# Patient Record
Sex: MALE | Race: WHITE | NOT HISPANIC OR LATINO | Employment: OTHER | URBAN - METROPOLITAN AREA
[De-identification: names, ages, dates, MRNs, and addresses within clinical notes are randomized per-mention and may not be internally consistent; named-entity substitution may affect disease eponyms.]

---

## 2017-04-27 ENCOUNTER — HISTORICAL (OUTPATIENT)
Dept: ADMINISTRATIVE | Facility: HOSPITAL | Age: 77
End: 2017-04-27

## 2018-01-18 ENCOUNTER — HISTORICAL (OUTPATIENT)
Dept: ADMINISTRATIVE | Facility: HOSPITAL | Age: 78
End: 2018-01-18

## 2020-02-04 ENCOUNTER — HISTORICAL (OUTPATIENT)
Dept: ADMINISTRATIVE | Facility: HOSPITAL | Age: 80
End: 2020-02-04

## 2022-04-11 ENCOUNTER — HISTORICAL (OUTPATIENT)
Dept: ADMINISTRATIVE | Facility: HOSPITAL | Age: 82
End: 2022-04-11

## 2022-04-24 VITALS
BODY MASS INDEX: 25.19 KG/M2 | HEIGHT: 72 IN | SYSTOLIC BLOOD PRESSURE: 137 MMHG | DIASTOLIC BLOOD PRESSURE: 77 MMHG | WEIGHT: 186 LBS

## 2022-05-05 NOTE — HISTORICAL [facility] CERNER
This is a historical note converted from Miguelina. Formatting and pictures may have been removed.  Please reference Miguelina for original formatting and attached multimedia. Chief Complaint  Pt is here for right elbow pain. Pt stated the pain started 4 months ago when he was lifting something heavy and he heard a pop  History of Present Illness  having right elbow pain on the inner side  no swelling or redness  no numbness or tingling  Review of Systems  Review of Systems?  ?????Constitutional: ?No fever, No chills. ?  ?????Respiratory: ?No shortness of breath, No cough. ?  ?????Cardiovascular: ?No chest pain. ?  ?????Gastrointestinal: ?No nausea, No vomiting, No diarrhea, No constipation, No heartburn. ?  ?????Genitourinary: ?No dysuria, No hematuria. ?  ?????Hematology/Lymphatics: ?No bleeding tendency. ?  ?????Endocrine: ?No polyuria. ?  ?????Neurologic: ?Alert and oriented X4, No numbness, No tingling. ?  ?????Psychiatric: ?No anxiety, No depression. ?  ?????Integumentary: no abnormalities except as noted in history of present illness  Physical Exam  Vitals & Measurements  T:?36.1? ?C (Oral)? HR:?80(Peripheral)? BP:?137/77?  HT:?182?cm? WT:?84.36?kg? BMI:?25.47?  right elbow exam  TTP medial epicondyle  negative Tinel at the elbow  pain with gripping  intact motor  full ROM of the elbow, wrist and hand  no edema  no erythema  ?  right elbow x-rays taken today  no acute bony abnormalities  ?  diagnosis  right elbow medial epicondylitis  ?  plan  right golfers elbow injection (medial epicondylitis)  Administered corticosteroid injection??1cc cortisone and 1cc lidocaine using sterile technique after informed verbal consent. Risks discussed with patient prior to injection. Informed the patient of the following:  -?Explained to patient that this injection in some patients will experience increased pain a few minutes after the injection and that the pain will last anywhere from 10 to 20 minutes. In order to decrease the  pain you need to do the following:  ?Make sure to move the extremity in which the injection was given. If you do not move the medication sits there and can cause more pain.  Ice the affected joint every 2 hours for 20 minutes at a time for the next 24 hours.  ?If you are not already taking an anti-inflammatory take the following Ibuprofen/ Advil take 3 to 4 tablets every 6 to 8 hours or 2 Aleve every 12 hours for the next 5 days to help the medication work. If you cannot take anti-inflammatory take 2 extra strength Tylenol every 6 hours for the next 5 days.  Patient voiced understanding ?????????????????????????????????????????????????????????????????????????????  Assessment/Plan  1.?Medial epicondylitis, right elbow?M77.01  Ordered:  meloxicam, 7.5 mg = 1 tab(s), Oral, Daily, # 90 tab(s), 0 Refill(s), Pharmacy: [facility], 182, cm, Height/Length Dosing, 02/04/20 11:10:00 CST, 84.36, kg, Weight Dosing, 02/04/20 11:10:00 CST  ?  Orders:  XR Elbow Right Minimum 3 Views, Routine, 02/04/20 11:11:00 CST, Pain, None, Ambulatory, Rad Type, Right elbow pain, 02/04/20 11:11:00 CST   Problem List/Past Medical History  Ongoing  Acid reflux  Diabetes mellitus  High cholesterol  Insomnia  Medial epicondylitis, right elbow  Prostate  Right tennis elbow  Historical  No qualifying data  Procedure/Surgical History  GALLBLADDER (2003)  HERNIA REPAIR (2003)  OPEN HEART SURGERY (1999)  cardiac ablation  LEFT ARM SURGERY  STONE REMOVED FROM BLADDER   Medications  aspirin 81 mg oral tablet, 81 mg= 1 tab(s), Oral, Daily  ATORVASTATIN TAB 20MG, 20 mg= 1 tab(s), Oral, Daily  Centrum Silver oral tablet, 1 tab(s), Oral, Daily  Co-Q10, Oral  DILTIAZEM CAP 240MG ER, 240 mg= 1 cap(s), Oral, Daily  folic acid 0.4 mg oral tablet, 0.4 mg= 1 tab(s), Oral, Daily  meloxicam 7.5 mg oral tablet, 7.5 mg= 1 tab(s), Oral, Daily  METFORMIN TAB 500MG, 500 mg= 1 tab(s), Oral, BID  NATURE-THROID 32.5MG Tablets, 32.5 mg= 1 tab(s), Oral,  Daily  PANTOPRAZOLE TAB 40MG, 40 mg= 1 tab(s), Oral, Daily  TAMSULOSIN CAP 0.4MG, 0.4 mg= 1 cap(s), Oral, Daily  Vitamin B6 100 mg oral tablet, 100 mg= 1 tab(s), Oral, Daily  Vitamin C 1000 mg oral tablet, 1000 mg= 1 tab(s), Oral, Daily  vitamin E 400 intl units oral capsule, 400 IntUnit= 1 cap(s), Oral, Daily  ZOLPIDEM TAB 10MG, 10 mg= 1 tab(s), Oral, qPM  Allergies  No Known Medication Allergies  Social History  Abuse/Neglect  No, 02/04/2020  Alcohol  Current, Liquor, 1-2 times per month, 01/18/2018  Substance Use  Never, 01/18/2018  Tobacco  Former smoker, quit more than 30 days ago, N/A, 02/04/2020  Former smoker, 01/18/2018  Family History  Family history is unknown  Health Maintenance  Health Maintenance  ???Pending?(in the next year)  ??? ??OverDue  ??? ? ? ?Diabetes Maintenance-Urine Dipstick due??04/27/18??and every 1??year(s)  ??? ? ? ?Aspirin Therapy for CVD Prevention due??01/18/19??and every 1??year(s)  ??? ? ? ?Advance Directive due??01/01/20??and every 1??year(s)  ??? ? ? ?Geriatric Depression Screening due??01/01/20??and every 1??year(s)  ??? ??Due?  ??? ? ? ?Cognitive Screening due??01/01/20??and every 1??year(s)  ??? ? ? ?Fall Risk Assessment due??01/01/20??and every 1??year(s)  ??? ? ? ?Functional Assessment due??01/01/20??and every 1??year(s)  ??? ? ? ?ADL Screening due??02/04/20??and every 1??year(s)  ??? ? ? ?Diabetes Maintenance-Microalbumin due??02/04/20??Variable frequency  ??? ? ? ?Diabetes Maintenance-Serum Creatinine due??02/04/20??Variable frequency  ??? ? ? ?Pneumococcal Vaccine due??02/04/20??Variable frequency  ??? ? ? ?Tetanus Vaccine due??02/04/20??and every 10??year(s)  ??? ? ? ?Zoster Vaccine due??02/04/20??and every 100??year(s)  ??? ??Due In Future?  ??? ? ? ?Obesity Screening not due until??01/01/21??and every 1??year(s)  ???Satisfied?(in the past 1 year)  ??? ??Satisfied?  ??? ? ? ?Blood Pressure Screening on??02/04/20.??Satisfied by Flaquita Kevin LPN  ??? ? ? ?Body Mass  Index Check on??02/04/20.??Satisfied by Flaquita Kevin LPN  ??? ? ? ?Obesity Screening on??02/04/20.??Satisfied by Flaquita Kevin LPN  ?

## 2022-05-05 NOTE — HISTORICAL [facility] CERNER
This is a historical note converted from Miguelina. Formatting and pictures may have been removed.  Please reference Miguelina for original formatting and attached multimedia. Chief Complaint  NEW PATIENT HERE FOR RIGHT ELBOW PAIN. WILL GET XRAYS TODAY  History of Present Illness  right elbow pain on the outer side  no trauma of falls  pain is not debilitating  Review of Systems  Review of Systems  ????Constitutional: No fever, No chills.  ????Respiratory: No shortness of breath, No cough.  ????Cardiovascular: No chest pain.  ????Gastrointestinal: No nausea, No vomiting, No diarrhea, No constipation, No heartburn.  ????Genitourinary: No dysuria, No hematuria.  ????Hematology/Lymphatics: No bleeding tendency.  ????Endocrine: No polyuria.  ????Neurologic: Alert and oriented X4, No numbness, No tingling.  ????Psychiatric: No anxiety, No depression.  Physical Exam  Vitals & Measurements  HT:?182?cm? HT:?182?cm? WT:?84.36?kg? WT:?84.36?kg? BMI:?25.47?  right elbow PE  ?  TTP lateral epicondyle  full ROM  intact sensation and motor  pain with resisted extension of the wrist  pain with gripping  ?  x-rays today  no bony abnormalities  ?  NSAIDS OTC  avoid hard gripping  ?  Assessment/Plan  1.?Right tennis elbow  Ordered:  Clinic Follow up, 01/18/18 14:44:00 CST, prn, Right tennis elbow, [facility]  Office/Outpatient Visit Level 3 New [id number] PC, Right tennis elbow, [facility], 01/18/18 14:44:00 CST  ?  Right elbow pain  Ordered:  XR Elbow Right Minimum 3 Views, Routine, 01/18/18 14:19:00 CST, Pain, None, Ambulatory, Rad Type, Right elbow pain, 01/18/18 14:19:00 CST  ?   Problem List/Past Medical History  Ongoing  Acid reflux  Diabetes mellitus  High cholesterol  Insomnia  Prostate  Right tennis elbow  Historical  Procedure/Surgical History  GALLBLADDER (2003)  HERNIA REPAIR (2003)  OPEN HEART SURGERY (1999)  LEFT ARM SURGERY  STONE REMOVED FROM BLADDER  Medications  aspirin 81 mg oral tablet, 81 mg= 1 tab(s),  Oral, Daily  ATORVASTATIN TAB 20MG, 20 mg= 1 tab(s), Oral, Daily  Centrum Silver oral tablet, 1 tab(s), Oral, Daily  Co-Q10, Oral  DILTIAZEM CAP 240MG ER, 240 mg= 1 cap(s), Oral, Daily  folic acid 0.4 mg oral tablet, 0.4 mg= 1 tab(s), Oral, Daily  METFORMIN TAB 500MG, 500 mg= 1 tab(s), Oral, BID  NATURE-THROID 32.5MG Tablets, 32.5 mg= 1 tab(s), Oral, Daily  PANTOPRAZOLE TAB 40MG, 40 mg= 1 tab(s), Oral, Daily  TAMSULOSIN CAP 0.4MG, 0.4 mg= 1 cap(s), Oral, Daily  Vitamin B6 100 mg oral tablet, 100 mg= 1 tab(s), Oral, Daily  Vitamin C 1000 mg oral tablet, 1000 mg= 1 tab(s), Oral, Daily  vitamin E 400 intl units oral capsule, 400 IntUnit= 1 cap(s), Oral, Daily  ZOLPIDEM TAB 10MG, 10 mg= 1 tab(s), Oral, qPM  Allergies  No Known Medication Allergies  Social History  Alcohol - 01/18/2018  Current, Liquor, 1-2 times per month  Substance Abuse - 01/18/2018  Never  Tobacco - 01/18/2018  Former smoker  Family History  Family history is unknown

## 2023-04-12 DIAGNOSIS — R41.3 MEMORY IMPAIRMENT: Primary | ICD-10-CM

## 2023-07-10 ENCOUNTER — OFFICE VISIT (OUTPATIENT)
Dept: NEUROLOGY | Facility: CLINIC | Age: 83
End: 2023-07-10
Payer: MEDICARE

## 2023-07-10 VITALS
DIASTOLIC BLOOD PRESSURE: 68 MMHG | HEIGHT: 72 IN | BODY MASS INDEX: 26.28 KG/M2 | SYSTOLIC BLOOD PRESSURE: 116 MMHG | HEART RATE: 72 BPM | WEIGHT: 194 LBS

## 2023-07-10 DIAGNOSIS — F03.A0 MILD DEMENTIA WITHOUT BEHAVIORAL DISTURBANCE, PSYCHOTIC DISTURBANCE, MOOD DISTURBANCE, OR ANXIETY, UNSPECIFIED DEMENTIA TYPE: Primary | ICD-10-CM

## 2023-07-10 DIAGNOSIS — R41.3 MEMORY IMPAIRMENT: ICD-10-CM

## 2023-07-10 PROCEDURE — 99205 PR OFFICE/OUTPT VISIT, NEW, LEVL V, 60-74 MIN: ICD-10-PCS | Mod: S$PBB,,, | Performed by: PSYCHIATRY & NEUROLOGY

## 2023-07-10 PROCEDURE — 99999 PR PBB SHADOW E&M-EST. PATIENT-LVL III: CPT | Mod: PBBFAC,,, | Performed by: PSYCHIATRY & NEUROLOGY

## 2023-07-10 PROCEDURE — 99205 OFFICE O/P NEW HI 60 MIN: CPT | Mod: S$PBB,,, | Performed by: PSYCHIATRY & NEUROLOGY

## 2023-07-10 PROCEDURE — 99999 PR PBB SHADOW E&M-EST. PATIENT-LVL III: ICD-10-PCS | Mod: PBBFAC,,, | Performed by: PSYCHIATRY & NEUROLOGY

## 2023-07-10 PROCEDURE — 99213 OFFICE O/P EST LOW 20 MIN: CPT | Mod: PBBFAC | Performed by: PSYCHIATRY & NEUROLOGY

## 2023-07-10 RX ORDER — PANTOPRAZOLE SODIUM 40 MG/1
40 TABLET, DELAYED RELEASE ORAL DAILY
COMMUNITY

## 2023-07-10 RX ORDER — GABAPENTIN 400 MG/1
1600 CAPSULE ORAL 2 TIMES DAILY
COMMUNITY
Start: 2023-06-12

## 2023-07-10 RX ORDER — ATORVASTATIN CALCIUM 40 MG/1
40 TABLET, FILM COATED ORAL NIGHTLY
COMMUNITY
Start: 2023-07-03

## 2023-07-10 RX ORDER — FLUOXETINE HYDROCHLORIDE 20 MG/1
20 CAPSULE ORAL DAILY
COMMUNITY
Start: 2023-07-03

## 2023-07-10 RX ORDER — DONEPEZIL HYDROCHLORIDE 5 MG/1
TABLET, FILM COATED ORAL
Qty: 60 TABLET | Refills: 11 | Status: SHIPPED | OUTPATIENT
Start: 2023-07-10

## 2023-07-10 RX ORDER — ASPIRIN 81 MG/1
81 TABLET ORAL DAILY
COMMUNITY

## 2023-07-10 RX ORDER — ZOLPIDEM TARTRATE 10 MG/1
10 TABLET ORAL NIGHTLY
COMMUNITY
Start: 2023-07-03

## 2023-07-10 RX ORDER — METFORMIN HYDROCHLORIDE 500 MG/1
500 TABLET ORAL 2 TIMES DAILY
COMMUNITY

## 2023-07-10 RX ORDER — TESTOSTERONE CYPIONATE 200 MG/ML
INJECTION, SOLUTION INTRAMUSCULAR
COMMUNITY
Start: 2023-06-19

## 2023-07-10 NOTE — PROGRESS NOTES
[facility] 7/10/2023   Alternating [place] Making 1 - correct   Cube 1 - correct   Clock Contour 1 - correct   Clock Numbers 1 - correct   Clock Hands 0 - incorrect   Lion 1 - correct   Rhino 1 - correct   Camel 1 - correct   Face Yes   Velvet Yes   Moravian Yes   Bonnie No   Red Yes   Face Yes   Velvet Yes   Moravian No   Bonnie Yes   Red No   Forward Order - 2 1 8 5 4 1 - correct   Attention - Backward Order 1 - correct   Letters 1 - correct   Numbers 3 - 4 or 5 correct   Repeat - Rolando 1 - correct   Repeat - Cat 1 - correct   Fluency  1 - correct   Train - Bicycle 1 - correct   Watch-Ruler 1 - correct   Face 0 - incorrect   Velvet 0 - incorrect   Moravian 1 - correct   Bonnie 1 - correct   Red 1 - correct   Date 0 - incorrect   Month 1 - correct   Year 1 - correct   Day 1 - correct   Place 1 - correct   City 1 - correct   Add 1 point if less than or equal to 12 yr edu 0 - incorrect   Total Score 26

## 2023-07-10 NOTE — PROGRESS NOTES
Chief Complaint   Patient presents with    Memory Loss     NP: Referred by Dr. Clif Christensen for neuro consult to evaluate for memory loss: 2 years ago started having memory loss and it is getting worse. Having difficulty with remembering names. Averages 8 hours of sleep at night. Still drive, lives with spouse. Does get frustrated when he cannot remember.        This is a 83 y.o. male  here for  memory loss.  Referred by Dr. Christensen.  Having difficulty with remembering names, as well as details of conversations he has with his wife.  He does still drive without problems.  He does not misplace things around the house.  He is quite active and actually helps with caregiving for his wife.  On 1 occasion patient had an episode for several hours in the morning where he lost time.  He woke up later and admitted to his wife that he did not remember what had occurred that morning.  She had fallen and the patient is actually actually fallen on top of her.  Ambulance was called to lift her up and check on her.  The patient did not remember any of this.  This was a single occurrence and nothing like this has ever happened before.  Notably the patient does take Ambien 10 mg at night and without it can not sleep.  He was previously taking Benadryl but was told to stop.  No mood symptoms.  No significant agitation.    MRI brain [facility] Mri- 2/222- significant volume loss and microvascular ischemia      Medication List with Changes/Refills   Current Medications    ASPIRIN (ECOTRIN) 81 MG EC TABLET    Take 81 mg by mouth Daily.    ATORVASTATIN (LIPITOR) 40 MG TABLET    Take 40 mg by mouth every evening.    FLUOXETINE 20 MG CAPSULE    Take 20 mg by mouth Daily.    GABAPENTIN (NEURONTIN) 400 MG CAPSULE    Take 1,600 mg by mouth 2 (two) times daily.    METFORMIN (GLUCOPHAGE) 500 MG TABLET    Take 500 mg by mouth 2 (two) times daily.    PANTOPRAZOLE (PROTONIX) 40 MG TABLET    Take 40 mg by mouth Daily.    TESTOSTERONE CYPIONATE  (DEPOTESTOTERONE CYPIONATE) 200 MG/ML INJECTION    Inject into the muscle every 7 days.    ZOLPIDEM (AMBIEN) 10 MG TAB    Take 10 mg by mouth every evening.        Past Surgical History:   Procedure Laterality Date    CARDIAC ELECTROPHYSIOLOGY MAPPING AND ABLATION      Double bypass  11/1998    GALLBLADDER SURGERY  04/2003    HERNIA REPAIR  04/2003    Removal of gallstones          Past Medical History:   Diagnosis Date    Anxiety disorder, unspecified     CAD (coronary artery disease)     Insomnia     Mixed hyperlipidemia     Neuropathy     Type 2 diabetes mellitus with unspecified diabetic retinopathy without macular edema         Family History   Problem Relation Age of Onset    Dementia Mother     Breast cancer Mother     Crohn's disease Brother         Social History     Socioeconomic History    Marital status:    Tobacco Use    Smoking status: Former     Types: Cigarettes    Smokeless tobacco: Never   Substance and Sexual Activity    Alcohol use: Yes     Alcohol/week: 3.0 standard drinks     Types: 1 Cans of beer, 2 Shots of liquor per week     Comment: 3 times weekly    Drug use: Never          Review of Systems  Review of Systems   Constitutional: Negative for appetite change.   HENT: Negative for sinus pressure and sore throat.    Eyes: Negative for visual disturbance.   Respiratory: Negative for cough and shortness of breath.    Cardiovascular: Negative for chest pain.   Gastrointestinal: Negative for diarrhea and nausea.   Endocrine: Negative for cold intolerance and heat intolerance.   Genitourinary: Negative for dysuria.   Musculoskeletal: Negative for arthralgias and myalgias.   Skin: Negative for rash.   Allergic/Immunologic: Negative for immunocompromised state.   Neurological:        See HPI   Hematological: Does not bruise/bleed easily.   Psychiatric/Behavioral: Negative for hallucinations.      General: alert and oriented, no acute distress, no audible wheezes, pulse intact, no  edema    Vitals:    07/10/23 0910   BP: 116/68   Pulse: 72        Cognition and Comprehension  Speech and language intact  Follows commands  Speech fluent  Attention intact  Memory for recent events intact from history taking  Affect pleasant  Fund of knowledge adequate    MOCA 26/30  missed 2/5 on recall    Cranial nerves  II. Optic: Visual fields full to confrontation both eyes  III, IV, VI. Oculomotor: Intact, Pupils equal, round and reactive to light, no nystagmus  V. Trigeminal: sensation to light touch normal  VII. No facial asymmetry or facial weakness  VIII. Hearing intact to spoken voice  IX/X. Glossopharyngeal/Vagus: Voice normal, palate rises symmetrically  XI. Axillary: Shoulder shrug normal  XII. Hypoglossal: Intact    Muscle Strength and Tone  Normal upper extremity tone  Normal lower extremity tone  Normal upper extremity strength  Normal lower extremity strength    Sensation  Intact to light touch and temperature    Reflexes  Normal and symmetric    Coordination and Gait  Finger to nose normal  Gait normal       Samson was seen today for memory loss.    Diagnoses and all orders for this visit:    Mild dementia without behavioral disturbance, psychotic disturbance, mood disturbance, or anxiety, unspecified dementia type    Memory impairment  -     Ambulatory referral/consult to Neurology  -     TSH; Future  -     Vitamin B12; Future    MCI.  Still very independent. Plan to start aricept

## 2024-02-20 ENCOUNTER — LAB REQUISITION (OUTPATIENT)
Dept: LAB | Facility: HOSPITAL | Age: 84
End: 2024-02-20
Payer: MEDICARE

## 2024-02-20 DIAGNOSIS — R79.9 ABNORMAL FINDING OF BLOOD CHEMISTRY, UNSPECIFIED: ICD-10-CM

## 2024-02-20 LAB — KOH PREP SPEC: NORMAL

## 2024-02-20 PROCEDURE — 87075 CULTR BACTERIA EXCEPT BLOOD: CPT | Performed by: OTOLARYNGOLOGY

## 2024-02-20 PROCEDURE — 87077 CULTURE AEROBIC IDENTIFY: CPT | Performed by: OTOLARYNGOLOGY

## 2024-02-20 PROCEDURE — 87205 SMEAR GRAM STAIN: CPT | Performed by: OTOLARYNGOLOGY

## 2024-02-20 PROCEDURE — 87220 TISSUE EXAM FOR FUNGI: CPT | Performed by: OTOLARYNGOLOGY

## 2024-02-21 LAB
GRAM STN SPEC: NORMAL
GRAM STN SPEC: NORMAL

## 2024-02-23 LAB
BACTERIA SPEC ANAEROBE CULT: NORMAL
BACTERIA SPEC CULT: ABNORMAL

## 2024-05-03 ENCOUNTER — OFFICE VISIT (OUTPATIENT)
Dept: NEUROLOGY | Facility: CLINIC | Age: 84
End: 2024-05-03
Payer: MEDICARE

## 2024-05-03 VITALS
HEIGHT: 72 IN | HEART RATE: 60 BPM | DIASTOLIC BLOOD PRESSURE: 70 MMHG | BODY MASS INDEX: 25.73 KG/M2 | WEIGHT: 190 LBS | SYSTOLIC BLOOD PRESSURE: 132 MMHG

## 2024-05-03 DIAGNOSIS — E11.42 DIABETIC POLYNEUROPATHY ASSOCIATED WITH TYPE 2 DIABETES MELLITUS: ICD-10-CM

## 2024-05-03 DIAGNOSIS — G31.84 MILD COGNITIVE IMPAIRMENT: Primary | ICD-10-CM

## 2024-05-03 PROCEDURE — 99214 OFFICE O/P EST MOD 30 MIN: CPT | Mod: S$PBB,,, | Performed by: NURSE PRACTITIONER

## 2024-05-03 PROCEDURE — 99999 PR PBB SHADOW E&M-EST. PATIENT-LVL III: CPT | Mod: PBBFAC,,, | Performed by: NURSE PRACTITIONER

## 2024-05-03 PROCEDURE — 99213 OFFICE O/P EST LOW 20 MIN: CPT | Mod: PBBFAC | Performed by: NURSE PRACTITIONER

## 2024-05-03 NOTE — ASSESSMENT & PLAN NOTE
-already on gbp.  Device from [employer] has helped with pain control and feels symptoms are manageable for now sow ill hold off on any additional meds.

## 2024-05-03 NOTE — ASSESSMENT & PLAN NOTE
-over the last 4 months, the patient had 2 surgeries, lost his son, and had infections with COVID and strep.  He has been up and able to work out in 4 months as a result of all of these issues.  I am hoping that the stress response has caused a temporary worsening of his cognition, but we will see him back in 6 months to re-evaluate.  Continue Aricept 10 mg.  Resume exercise once able

## 2024-05-03 NOTE — PROGRESS NOTES
Subjective:       Patient ID: Samson Aleman is a 84 y.o. male.    Chief Complaint: Memory Loss (Pt states he has noticed some progression with long term )      History of Present Illness:  Follow-up visit for memory.  Called for a sooner than regularly scheduled appointment due to worsening cognition.  He has noticed worsening over the past several months.  Notably, he has had a very stressful 4-5 months.  Since January, he has had a AAA repair, major sinus surgery, had son passed away, and contracted COVID and strep infections.  He historically is an avid exerciser, but has not been able to exercise at all over the past 4 months because of everything going on in life.  He plans to resume very soon.  He tells me he is having difficulty with short-term and long-term memory.  No changes in personality or mood.  No issues with tremor falls.  No problems swallowing.  He did have labs after last visit including B12 and TSH which were normal.    He tells me Dr. Christensen wanted him to discuss his neuropathy with us.  Recall, patient is a diabetic.  He tells me he is dealt with BLE neuropathy for several years.  Over the past year so, the neuropathy has progressed upwards to just above the knee where it stopped around the calf line for several years.  He is currently on gabapentin which helps with his pain.  He says his PCP recently tried to start him on another medication for neuropathy, but it caused mouth foaming.  I do not have these records.  Patient is unsure what his last A1c was.  He did order some device off [employer] that has really helped to improve his pain.  It is overall manageable now.            Past Medical History:   Diagnosis Date    Anxiety disorder, unspecified     CAD (coronary artery disease)     Insomnia     Mixed hyperlipidemia     Neuropathy     Type 2 diabetes mellitus with unspecified diabetic retinopathy without macular edema        Past Surgical History:   Procedure Laterality Date    CARDIAC  ELECTROPHYSIOLOGY MAPPING AND ABLATION      Double bypass  11/1998    GALLBLADDER SURGERY  04/2003    HERNIA REPAIR  04/2003    Removal of gallstones          Family History   Problem Relation Name Age of Onset    Dementia Mother      Breast cancer Mother      Crohn's disease Brother          Social History     Socioeconomic History    Marital status:    Tobacco Use    Smoking status: Former     Types: Cigarettes    Smokeless tobacco: Never   Substance and Sexual Activity    Alcohol use: Yes     Alcohol/week: 3.0 standard drinks of alcohol     Types: 1 Cans of beer, 2 Shots of liquor per week     Comment: 3 times weekly    Drug use: Never     Social Determinants of Health     Financial Resource Strain: Low Risk  (1/15/2024)    [facility]    Overall Financial Resource Strain (CARDIA)     Difficulty of Paying Living Expenses: Not hard at all   Food Insecurity: No Food Insecurity (1/15/2024)    [facility]    Hunger Vital Sign     Worried About Running Out of Food in the Last Year: Never true     Ran Out of Food in the Last Year: Never true   Transportation Needs: No Transportation Needs (1/15/2024)    [facility]    PRAPARE - Transportation     Lack of Transportation (Medical): No     Lack of Transportation (Non-Medical): No   Physical Activity: Insufficiently Active (7/25/2019)    [facility]    Exercise Vital Sign     Days of Exercise per Week: 3 days     Minutes of Exercise per Session: 30 min   Stress: No Stress Concern Present (7/25/2019)    Received from [facility]    [facility]  of Occupational Health - Occupational Stress Questionnaire     Feeling of Stress : Only a little        Outpatient Encounter Medications as of 5/3/2024   Medication Sig Dispense Refill    aspirin (ECOTRIN) 81 MG EC tablet Take 81 mg by mouth Daily.      atorvastatin (LIPITOR) 40 MG tablet Take 40 mg by mouth every evening.      donepeziL (ARICEPT) 5 MG tablet Take 1 tab Po Q PM for 1 month, then increase to 1 PO BID thereafter (Patient taking differently: Take 10 mg by mouth once daily.) 60 tablet 11    FLUoxetine 20 MG capsule Take 20 mg by mouth Daily.      gabapentin (NEURONTIN) 400 MG capsule Take 1,600 mg by mouth 2 (two) times a day.      metFORMIN (GLUCOPHAGE) 500 MG tablet Take 500 mg by mouth 2 (two) times daily.      testosterone cypionate (DEPOTESTOTERONE CYPIONATE) 200 mg/mL injection Inject into the muscle every 7 days.      zolpidem (AMBIEN) 10 mg Tab Take 10 mg by mouth every evening.      [DISCONTINUED] pantoprazole (PROTONIX) 40 MG tablet Take 40 mg by mouth Daily.       No facility-administered encounter medications on file as of 5/3/2024.      Objective:   /70   Pulse 60   Ht 6' (1.829 m)   Wt 86.2 kg (190 lb)   BMI 25.77 kg/m²        Physical Exam  General:  Alert and oriented  NAD  No overt edema    Cognition and Comprehension:  Speech and language intact  Follows commands    Cranial nerves:   CN 2_ Visual fields (full to confrontation both eyes)  CN 3, 4, 6_ Intact, DAYRON, no nystagmus  CN 5_facial tactile sensation intact  CN 7_no face asymmetry; normal eye closure and smile  CN 8_hearing intact to spoken voice  CN 9, 10, 11_voice normal, shoulder shrug ok; deltoids not fatigable   CN 12 tongue_protrudes mid line    Muscle Strength and Tone:  Normal upper extremity tone  Normal lower extremity tone  Normal upper extremity strength  Normal lower extremity strength    Reflexes:  Normal and symmetric    Sensation:  Intact to light touch and temperature    Coordination and  Gait:  Coordination and gait are normal       Assessment & Plan:      1. Mild cognitive impairment  Overview:  Initially evaluated in July 2023 for difficulty with short-term memory that started about 2 years prior.  MRI brain 2022 with significant volume loss and chronic microvascular ischemia.    MOCAs:   07/2023: 26/30-missed 2 on recall  05/2024: 23/30    Assessment & Plan:  -over the last 4 months, the patient had 2 surgeries, lost his son, and had infections with COVID and strep.  He has been up and able to work out in 4 months as a result of all of these issues.  I am hoping that the stress response has caused a temporary worsening of his cognition, but we will see him back in 6 months to re-evaluate.  Continue Aricept 10 mg.  Resume exercise once able      2. Diabetic polyneuropathy associated with type 2 diabetes mellitus  Assessment & Plan:  -already on gbp.  Device from [employer] has helped with pain control and feels symptoms are manageable for now sow ill hold off on any additional meds.            This note was created with the assistance of voice recognition software. There may be transcription errors as a result of using this technology however minimal. Effort has been made to assure accuracy of transcription but any obvious errors or omissions should be clarified with the author of the document.

## 2024-07-23 ENCOUNTER — LAB REQUISITION (OUTPATIENT)
Dept: LAB | Facility: HOSPITAL | Age: 84
End: 2024-07-23
Payer: MEDICARE

## 2024-07-23 DIAGNOSIS — R79.9 ABNORMAL FINDING OF BLOOD CHEMISTRY, UNSPECIFIED: ICD-10-CM

## 2024-07-23 LAB
GRAM STN SPEC: NORMAL
GRAM STN SPEC: NORMAL

## 2024-07-23 PROCEDURE — 87205 SMEAR GRAM STAIN: CPT | Performed by: OTOLARYNGOLOGY

## 2024-07-23 PROCEDURE — 87186 SC STD MICRODIL/AGAR DIL: CPT | Mod: 91 | Performed by: OTOLARYNGOLOGY

## 2024-07-23 PROCEDURE — 87075 CULTR BACTERIA EXCEPT BLOOD: CPT | Performed by: OTOLARYNGOLOGY

## 2024-07-23 PROCEDURE — 87070 CULTURE OTHR SPECIMN AEROBIC: CPT | Performed by: OTOLARYNGOLOGY

## 2024-07-23 PROCEDURE — 87077 CULTURE AEROBIC IDENTIFY: CPT | Mod: 91 | Performed by: OTOLARYNGOLOGY

## 2024-07-26 LAB — BACTERIA SPEC ANAEROBE CULT: NORMAL

## 2024-07-27 LAB
BACTERIA SPEC CULT: ABNORMAL
BACTERIA SPEC CULT: ABNORMAL

## 2024-11-08 ENCOUNTER — OFFICE VISIT (OUTPATIENT)
Dept: NEUROLOGY | Facility: CLINIC | Age: 84
End: 2024-11-08
Payer: MEDICARE

## 2024-11-08 VITALS
WEIGHT: 191.38 LBS | BODY MASS INDEX: 25.92 KG/M2 | DIASTOLIC BLOOD PRESSURE: 73 MMHG | SYSTOLIC BLOOD PRESSURE: 128 MMHG | HEART RATE: 53 BPM | HEIGHT: 72 IN

## 2024-11-08 DIAGNOSIS — E11.42 DIABETIC POLYNEUROPATHY ASSOCIATED WITH TYPE 2 DIABETES MELLITUS: Primary | ICD-10-CM

## 2024-11-08 DIAGNOSIS — G31.84 MILD COGNITIVE IMPAIRMENT: ICD-10-CM

## 2024-11-08 PROCEDURE — 99999 PR PBB SHADOW E&M-EST. PATIENT-LVL III: CPT | Mod: PBBFAC,,, | Performed by: NURSE PRACTITIONER

## 2024-11-08 PROCEDURE — 99213 OFFICE O/P EST LOW 20 MIN: CPT | Mod: PBBFAC | Performed by: NURSE PRACTITIONER

## 2024-11-08 RX ORDER — FINASTERIDE 5 MG/1
1 TABLET, FILM COATED ORAL DAILY
COMMUNITY
Start: 2024-10-03

## 2024-11-08 NOTE — PROGRESS NOTES
Subjective:       Patient ID: Samson Aleman is a 84 y.o. male.    Chief Complaint: Memory Loss (Pt states memory seems to be doing better) and Diabetic neuropthy (Pt states he is stable doing well with gbp )      History of Present Illness:  Follow-up visit for memory neuropathy.  Neuropathy is stable with current dose of gabapentin.  He says his cognition is back to baseline prior to our last visit.  Recall, at our last visit in May, he had noted significant worsening in cognition, but it also had 2 major surgeries, a son passed away, and infections with COVID and strep in a 4-5 month time span.  Now, he feels he is back to his baseline.  He does still have some minor issues with short-term memory like remembering people's names, but is otherwise doing well.  He is still on Aricept 10 mg daily.            Past Medical History:   Diagnosis Date    Anxiety disorder, unspecified     CAD (coronary artery disease)     Insomnia     Mixed hyperlipidemia     Neuropathy     Type 2 diabetes mellitus with unspecified diabetic retinopathy without macular edema        Past Surgical History:   Procedure Laterality Date    CARDIAC ELECTROPHYSIOLOGY MAPPING AND ABLATION      Double bypass  11/1998    GALLBLADDER SURGERY  04/2003    HERNIA REPAIR  04/2003    Removal of gallstones          Family History   Problem Relation Name Age of Onset    Dementia Mother      Breast cancer Mother      Crohn's disease Brother          Social History     Socioeconomic History    Marital status:    Tobacco Use    Smoking status: Former     Types: Cigarettes    Smokeless tobacco: Never   Substance and Sexual Activity    Alcohol use: Yes     Alcohol/week: 3.0 standard drinks of alcohol     Types: 1 Cans of beer, 2 Shots of liquor per week     Comment: 3 times weekly    Drug use: Never     Social Drivers of Health     Financial Resource Strain: Low Risk  (1/15/2024)    [facility] and Its  Subsidiaries and Affiliates    Overall Financial Resource Strain (CARDIA)     Difficulty of Paying Living Expenses: Not hard at all   Food Insecurity: No Food Insecurity (1/15/2024)    Received from [facility] and Its Subsidiaries and Affiliates    Hunger Vital Sign     Worried About Running Out of Food in the Last Year: Never true     Ran Out of Food in the Last Year: Never true   Transportation Needs: No Transportation Needs (1/15/2024)    Received from [facility] and Its Subsidiaries and Affiliates    PRAPARE - Transportation     Lack of Transportation (Medical): No     Lack of Transportation (Non-Medical): No   Physical Activity: Insufficiently Active (7/25/2019)    Received from [facility] and Its Subsidiaries and Affiliates    Exercise Vital Sign     Days of Exercise per Week: 3 days     Minutes of Exercise per Session: 30 min   Stress: No Stress Concern Present (7/25/2019)    Received from [facility] and Its Subsidiaries and Affiliates    [employer] - Occupational Stress Questionnaire     Feeling of Stress : Only a little   Housing Stability: Unknown (1/15/2024)    Received from [facility] and Its Subsidiaries and Affiliates    Housing Stability Vital Sign     Unable to Pay for Housing in the Last Year: No     Number of Places Lived in the Last Year: 1        Outpatient Encounter Medications as of 11/8/2024   Medication Sig Dispense Refill    aspirin (ECOTRIN) 81 MG EC tablet Take 81 mg by mouth Daily.      atorvastatin (LIPITOR) 40 MG tablet Take 40 mg by mouth every evening.      donepeziL (ARICEPT) 5 MG tablet Take 1 tab Po Q PM for 1 month, then increase to 1 PO BID thereafter (Patient taking differently: Take 10 mg by mouth once daily.) 60 tablet 11    finasteride (PROSCAR) 5 mg  tablet Take 1 tablet by mouth once daily.      FLUoxetine 20 MG capsule Take 20 mg by mouth Daily.      gabapentin (NEURONTIN) 400 MG capsule Take 1,600 mg by mouth 2 (two) times a day.      metFORMIN (GLUCOPHAGE) 500 MG tablet Take 500 mg by mouth 2 (two) times daily.      testosterone cypionate (DEPOTESTOTERONE CYPIONATE) 200 mg/mL injection Inject into the muscle every 7 days.      zolpidem (AMBIEN) 10 mg Tab Take 10 mg by mouth every evening.       No facility-administered encounter medications on file as of 11/8/2024.      Objective:   /73   Pulse (!) 53   Ht 6' (1.829 m)   Wt 86.8 kg (191 lb 6.4 oz)   BMI 25.96 kg/m²        Physical Exam  General:  Alert and oriented  NAD  No overt edema    Cognition and Comprehension:  Speech and language intact  Follows commands    Cranial nerves:   CN 2_ Visual fields (full to confrontation both eyes)  CN 3, 4, 6_ Intact, DAYRON, no nystagmus  CN 5_facial tactile sensation intact  CN 7_no face asymmetry; normal eye closure and smile  CN 8_hearing intact to spoken voice  CN 9, 10, 11_voice normal, shoulder shrug ok; deltoids not fatigable   CN 12 tongue_protrudes mid line    Muscle Strength and Tone:  Normal upper extremity tone  Normal lower extremity tone  Normal upper extremity strength  Normal lower extremity strength    Reflexes:  Normal and symmetric    Sensation:  Intact to light touch and temperature    Coordination and Gait:  Coordination and gait are normal       Assessment & Plan:      1. Diabetic polyneuropathy associated with type 2 diabetes mellitus  Assessment & Plan:  -continue gbp      2. Mild cognitive impairment  Overview:  Initially evaluated in July 2023 for difficulty with short-term memory that started about 2 years prior.  MRI brain 2022 with significant volume loss and chronic microvascular ischemia.    MOCAs:   07/2023: 26/30-missed 2 on recall  05/2024: 23/30    Assessment & Plan:  -back to baseline today.  Continue aricept  -diet,  exercise, social engagement            This note was created with the assistance of voice recognition software. There may be transcription errors as a result of using this technology however minimal. Effort has been made to assure accuracy of transcription but any obvious errors or omissions should be clarified with the author of the document.

## 2024-11-15 DIAGNOSIS — K62.89 RECTAL PAIN: Primary | ICD-10-CM

## 2024-11-18 ENCOUNTER — OFFICE VISIT (OUTPATIENT)
Dept: SURGICAL ONCOLOGY | Facility: CLINIC | Age: 84
End: 2024-11-18
Payer: MEDICARE

## 2024-11-18 VITALS
DIASTOLIC BLOOD PRESSURE: 74 MMHG | BODY MASS INDEX: 25.9 KG/M2 | SYSTOLIC BLOOD PRESSURE: 127 MMHG | WEIGHT: 191.19 LBS | HEART RATE: 65 BPM | HEIGHT: 72 IN

## 2024-11-18 DIAGNOSIS — K62.89 RECTAL PAIN: ICD-10-CM

## 2024-11-18 PROCEDURE — 99213 OFFICE O/P EST LOW 20 MIN: CPT | Mod: PBBFAC

## 2024-11-18 PROCEDURE — 99999 PR PBB SHADOW E&M-EST. PATIENT-LVL III: CPT | Mod: PBBFAC,,,

## 2024-11-18 PROCEDURE — 99204 OFFICE O/P NEW MOD 45 MIN: CPT | Mod: S$PBB,,,

## 2024-11-18 NOTE — H&P (VIEW-ONLY)
"   Colorectal Surgery  Patient ID: [id number]     Chief Complaint: Rectal Pain      HPI:     Samson Aleman is a 84 y.o. male here today for an initial consultation after referred by Dr. Mathew for rectal pain. He reports having a "burning" rectal pain radiating towards his tailbone since April with a foul odor. He states he had hemorrhoids that prolapsed and were banded at that time with no improvement of pain. He reports using multiple OTC creams for treatment without improvement. He denies rectal bleeding or noticing drainage.     Past Medical History:  has a past medical history of Anxiety disorder, unspecified, CAD (coronary artery disease), Insomnia, Mixed hyperlipidemia, Neuropathy, and Type 2 diabetes mellitus with unspecified diabetic retinopathy without macular edema.    Surgical History:  has a past surgical history that includes Double bypass (11/1998); Gallbladder surgery (04/2003); Hernia repair (04/2003); Removal of gallstones; and Cardiac electrophysiology mapping and ablation.    Family History: family history includes Breast cancer in his mother; Crohn's disease in his brother; Dementia in his mother.    Social History:  reports that he has quit smoking. His smoking use included cigarettes. He has never used smokeless tobacco. He reports current alcohol use of about 3.0 standard drinks of alcohol per week. He reports that he does not use drugs.    Current Outpatient Medications   Medication Instructions    aspirin (ECOTRIN) 81 mg, Daily    atorvastatin (LIPITOR) 40 mg, Nightly    donepeziL (ARICEPT) 5 MG tablet Take 1 tab Po Q PM for 1 month, then increase to 1 PO BID thereafter    finasteride (PROSCAR) 5 mg tablet 1 tablet, Daily    FLUoxetine 20 mg, Daily    gabapentin (NEURONTIN) 1,600 mg, 2 times daily    metFORMIN (GLUCOPHAGE) 500 mg, 2 times daily    testosterone cypionate (DEPOTESTOTERONE CYPIONATE) 200 mg/mL injection Every 7 days    zolpidem (AMBIEN) 10 mg, Nightly       Patient has No " "Known Allergies.     Patient Care Team:  CousinClif MD as PCP - General (Family Medicine)     Subjective:     Review of Systems    12 point review of systems conducted, negative except as stated in the history of present illness. See HPI for details.    Objective:     Visit Vitals  /74 (BP Location: Left arm, Patient Position: Sitting)   Pulse 65   Ht 6' (1.829 m)   Wt 86.7 kg (191 lb 3.2 oz)   SpO2 (P) 97%   BMI 25.93 kg/m²       Physical Exam    General: Alert and oriented, No acute distress.  Head: Normocephalic, Atraumatic.  Eye: Pupils are equal and round, Extraocular movements are intact, Sclera non-icteric.  Ears/Nose/Throat: Normal, Mucosa moist, Clear.  Respiratory: No wheezes, Non-labored respirations, Symmetrical chest wall expansion.  Cardiovascular: Regular rate.  Gastrointestinal: Soft, Non-tender, Non-distended, Normal bowel sounds, No palpable masses.  Rectal: No fissure, drainage, or induration noted. Anterior midline hemorrhoidal skin tag noted.   Integumentary: Warm, Dry, Intact, No rashes.  Extremities: No edema.  Neurologic: No focal deficits.  Psychiatric: Normal interaction, Coherent speech, Euthymic mood, Appropriate affect.     Labs Reviewed:     Chemistry:  Lab Results   Component Value Date     01/16/2024    K 4.1 01/16/2024    BUN 21 01/16/2024    CREATININE 1.05 01/16/2024    CALCIUM 8.8 01/16/2024    LABPROT 13.9 07/25/2024        Hematology:  No results found for: "WBC", "HGB", "HCT", "PLT"    Assessment:       ICD-10-CM ICD-9-CM   1. Rectal pain  K62.89 569.42        Plan:     1. Rectal pain  - Unable to appreciate a cause for his rectal pain  - Concerned for a fissure vs fistula due to reported foul odor  - Schedule EUA on 12/16/24  - Explained procedure in depth including risks and benefits of surgery. Patient willing to "fix" whatever is causing the pain. All questions answered at this time.    No follow-ups on file. In addition to their scheduled follow up, the " patient has also been instructed to follow up on as needed basis.     Future Appointments   Date Time Provider Department Center   5/19/2025 10:30 AM Avani Head MD [facility] [address]

## 2024-11-18 NOTE — PROGRESS NOTES
"   Colorectal Surgery  Patient ID: [id number]     Chief Complaint: Rectal Pain      HPI:     Samson Aleman is a 84 y.o. male here today for an initial consultation after referred by Dr. Mathew for rectal pain. He reports having a "burning" rectal pain radiating towards his tailbone since April with a foul odor. He states he had hemorrhoids that prolapsed and were banded at that time with no improvement of pain. He reports using multiple OTC creams for treatment without improvement. He denies rectal bleeding or noticing drainage.     Past Medical History:  has a past medical history of Anxiety disorder, unspecified, CAD (coronary artery disease), Insomnia, Mixed hyperlipidemia, Neuropathy, and Type 2 diabetes mellitus with unspecified diabetic retinopathy without macular edema.    Surgical History:  has a past surgical history that includes Double bypass (11/1998); Gallbladder surgery (04/2003); Hernia repair (04/2003); Removal of gallstones; and Cardiac electrophysiology mapping and ablation.    Family History: family history includes Breast cancer in his mother; Crohn's disease in his brother; Dementia in his mother.    Social History:  reports that he has quit smoking. His smoking use included cigarettes. He has never used smokeless tobacco. He reports current alcohol use of about 3.0 standard drinks of alcohol per week. He reports that he does not use drugs.    Current Outpatient Medications   Medication Instructions    aspirin (ECOTRIN) 81 mg, Daily    atorvastatin (LIPITOR) 40 mg, Nightly    donepeziL (ARICEPT) 5 MG tablet Take 1 tab Po Q PM for 1 month, then increase to 1 PO BID thereafter    finasteride (PROSCAR) 5 mg tablet 1 tablet, Daily    FLUoxetine 20 mg, Daily    gabapentin (NEURONTIN) 1,600 mg, 2 times daily    metFORMIN (GLUCOPHAGE) 500 mg, 2 times daily    testosterone cypionate (DEPOTESTOTERONE CYPIONATE) 200 mg/mL injection Every 7 days    zolpidem (AMBIEN) 10 mg, Nightly       Patient has No " "Known Allergies.     Patient Care Team:  CousinClif MD as PCP - General (Family Medicine)     Subjective:     Review of Systems    12 point review of systems conducted, negative except as stated in the history of present illness. See HPI for details.    Objective:     Visit Vitals  /74 (BP Location: Left arm, Patient Position: Sitting)   Pulse 65   Ht 6' (1.829 m)   Wt 86.7 kg (191 lb 3.2 oz)   SpO2 (P) 97%   BMI 25.93 kg/m²       Physical Exam    General: Alert and oriented, No acute distress.  Head: Normocephalic, Atraumatic.  Eye: Pupils are equal and round, Extraocular movements are intact, Sclera non-icteric.  Ears/Nose/Throat: Normal, Mucosa moist, Clear.  Respiratory: No wheezes, Non-labored respirations, Symmetrical chest wall expansion.  Cardiovascular: Regular rate.  Gastrointestinal: Soft, Non-tender, Non-distended, Normal bowel sounds, No palpable masses.  Rectal: No fissure, drainage, or induration noted. Anterior midline hemorrhoidal skin tag noted.   Integumentary: Warm, Dry, Intact, No rashes.  Extremities: No edema.  Neurologic: No focal deficits.  Psychiatric: Normal interaction, Coherent speech, Euthymic mood, Appropriate affect.     Labs Reviewed:     Chemistry:  Lab Results   Component Value Date     01/16/2024    K 4.1 01/16/2024    BUN 21 01/16/2024    CREATININE 1.05 01/16/2024    CALCIUM 8.8 01/16/2024    LABPROT 13.9 07/25/2024        Hematology:  No results found for: "WBC", "HGB", "HCT", "PLT"    Assessment:       ICD-10-CM ICD-9-CM   1. Rectal pain  K62.89 569.42        Plan:     1. Rectal pain  - Unable to appreciate a cause for his rectal pain  - Concerned for a fissure vs fistula due to reported foul odor  - Schedule EUA on 12/16/24  - Explained procedure in depth including risks and benefits of surgery. Patient willing to "fix" whatever is causing the pain. All questions answered at this time.    No follow-ups on file. In addition to their scheduled follow up, the " patient has also been instructed to follow up on as needed basis.     Future Appointments   Date Time Provider Department Center   5/19/2025 10:30 AM Avani Head MD [facility] [address]

## 2024-11-19 DIAGNOSIS — K62.89 RECTAL PAIN: Primary | ICD-10-CM

## 2024-11-19 RX ORDER — LIDOCAINE HYDROCHLORIDE 10 MG/ML
1 INJECTION, SOLUTION EPIDURAL; INFILTRATION; INTRACAUDAL; PERINEURAL ONCE
OUTPATIENT
Start: 2024-11-19 | End: 2024-11-19

## 2024-11-19 RX ORDER — SODIUM CHLORIDE 9 MG/ML
INJECTION, SOLUTION INTRAVENOUS CONTINUOUS
OUTPATIENT
Start: 2024-11-19

## 2024-12-03 ENCOUNTER — HOSPITAL ENCOUNTER (OUTPATIENT)
Dept: RADIOLOGY | Facility: HOSPITAL | Age: 84
Discharge: HOME | End: 2024-12-03
Attending: COLON & RECTAL SURGERY
Payer: MEDICARE

## 2024-12-03 DIAGNOSIS — K62.89 RECTAL PAIN: ICD-10-CM

## 2024-12-03 PROCEDURE — 71046 X-RAY EXAM CHEST 2 VIEWS: CPT | Mod: TC

## 2024-12-16 ENCOUNTER — HOSPITAL ENCOUNTER (OUTPATIENT)
Facility: HOSPITAL | Age: 84
Discharge: HOME | End: 2024-12-16
Attending: COLON & RECTAL SURGERY | Admitting: COLON & RECTAL SURGERY
Payer: MEDICARE

## 2024-12-16 ENCOUNTER — ANESTHESIA (OUTPATIENT)
Facility: HOSPITAL | Age: 84
End: 2024-12-16
Payer: MEDICARE

## 2024-12-16 ENCOUNTER — ANESTHESIA EVENT (OUTPATIENT)
Facility: HOSPITAL | Age: 84
End: 2024-12-16
Payer: MEDICARE

## 2024-12-16 DIAGNOSIS — K62.89 RECTAL PAIN: ICD-10-CM

## 2024-12-16 DIAGNOSIS — K64.4 RESIDUAL HEMORRHOID TAGS: Primary | ICD-10-CM

## 2024-12-16 LAB — POCT GLUCOSE: 117 MG/DL (ref 70–110)

## 2024-12-16 PROCEDURE — 37000009 HC ANESTHESIA EA ADD 15 MINS: Performed by: COLON & RECTAL SURGERY

## 2024-12-16 PROCEDURE — 25000003 PHARM REV CODE 250: Performed by: COLON & RECTAL SURGERY

## 2024-12-16 PROCEDURE — 63600175 PHARM REV CODE 636 W HCPCS: Performed by: NURSE ANESTHETIST, CERTIFIED REGISTERED

## 2024-12-16 PROCEDURE — D9220A PRA ANESTHESIA: Mod: CRNA,,, | Performed by: NURSE ANESTHETIST, CERTIFIED REGISTERED

## 2024-12-16 PROCEDURE — 37000008 HC ANESTHESIA 1ST 15 MINUTES: Performed by: COLON & RECTAL SURGERY

## 2024-12-16 PROCEDURE — 71000016 HC POSTOP RECOV ADDL HR: Performed by: COLON & RECTAL SURGERY

## 2024-12-16 PROCEDURE — 71000015 HC POSTOP RECOV 1ST HR: Performed by: COLON & RECTAL SURGERY

## 2024-12-16 PROCEDURE — 36000704 HC OR TIME LEV I 1ST 15 MIN: Performed by: COLON & RECTAL SURGERY

## 2024-12-16 PROCEDURE — 25000003 PHARM REV CODE 250: Performed by: NURSE ANESTHETIST, CERTIFIED REGISTERED

## 2024-12-16 PROCEDURE — D9220A PRA ANESTHESIA: Mod: ANES,,, | Performed by: ANESTHESIOLOGY

## 2024-12-16 PROCEDURE — 46250 REMOVE EXT HEM GROUPS 2+: CPT | Mod: ,,, | Performed by: COLON & RECTAL SURGERY

## 2024-12-16 PROCEDURE — 71000033 HC RECOVERY, INTIAL HOUR: Performed by: COLON & RECTAL SURGERY

## 2024-12-16 PROCEDURE — 25000003 PHARM REV CODE 250: Performed by: ANESTHESIOLOGY

## 2024-12-16 PROCEDURE — 88304 TISSUE EXAM BY PATHOLOGIST: CPT | Performed by: COLON & RECTAL SURGERY

## 2024-12-16 PROCEDURE — 36000705 HC OR TIME LEV I EA ADD 15 MIN: Performed by: COLON & RECTAL SURGERY

## 2024-12-16 RX ORDER — EPHEDRINE SULFATE 50 MG/ML
INJECTION, SOLUTION INTRAVENOUS
Status: DISCONTINUED | OUTPATIENT
Start: 2024-12-16 | End: 2024-12-16

## 2024-12-16 RX ORDER — LIDOCAINE HYDROCHLORIDE 10 MG/ML
1 INJECTION, SOLUTION EPIDURAL; INFILTRATION; INTRACAUDAL; PERINEURAL ONCE
Status: DISCONTINUED | OUTPATIENT
Start: 2024-12-16 | End: 2024-12-16 | Stop reason: HOSPADM

## 2024-12-16 RX ORDER — LIDOCAINE 40 MG/G
CREAM TOPICAL
Status: DISCONTINUED | OUTPATIENT
Start: 2024-12-16 | End: 2024-12-16 | Stop reason: HOSPADM

## 2024-12-16 RX ORDER — METOCLOPRAMIDE HYDROCHLORIDE 5 MG/ML
10 INJECTION INTRAMUSCULAR; INTRAVENOUS EVERY 10 MIN PRN
Status: DISCONTINUED | OUTPATIENT
Start: 2024-12-16 | End: 2024-12-16 | Stop reason: HOSPADM

## 2024-12-16 RX ORDER — SODIUM CHLORIDE 9 MG/ML
INJECTION, SOLUTION INTRAVENOUS CONTINUOUS
Status: DISCONTINUED | OUTPATIENT
Start: 2024-12-16 | End: 2024-12-16 | Stop reason: HOSPADM

## 2024-12-16 RX ORDER — PROPOFOL 10 MG/ML
VIAL (ML) INTRAVENOUS
Status: DISCONTINUED | OUTPATIENT
Start: 2024-12-16 | End: 2024-12-16

## 2024-12-16 RX ORDER — GLYCOPYRROLATE 0.2 MG/ML
INJECTION INTRAMUSCULAR; INTRAVENOUS
Status: DISCONTINUED | OUTPATIENT
Start: 2024-12-16 | End: 2024-12-16

## 2024-12-16 RX ORDER — ONDANSETRON HYDROCHLORIDE 2 MG/ML
INJECTION, SOLUTION INTRAMUSCULAR; INTRAVENOUS
Status: DISCONTINUED | OUTPATIENT
Start: 2024-12-16 | End: 2024-12-16

## 2024-12-16 RX ORDER — HYDROCORTISONE ACETATE 25 MG/1
SUPPOSITORY RECTAL
Status: DISCONTINUED | OUTPATIENT
Start: 2024-12-16 | End: 2024-12-16 | Stop reason: HOSPADM

## 2024-12-16 RX ORDER — FENTANYL CITRATE 50 UG/ML
INJECTION, SOLUTION INTRAMUSCULAR; INTRAVENOUS
Status: DISCONTINUED | OUTPATIENT
Start: 2024-12-16 | End: 2024-12-16

## 2024-12-16 RX ORDER — ROCURONIUM BROMIDE 10 MG/ML
INJECTION, SOLUTION INTRAVENOUS
Status: DISCONTINUED | OUTPATIENT
Start: 2024-12-16 | End: 2024-12-16

## 2024-12-16 RX ORDER — ONDANSETRON HYDROCHLORIDE 2 MG/ML
4 INJECTION, SOLUTION INTRAVENOUS ONCE
Status: DISCONTINUED | OUTPATIENT
Start: 2024-12-16 | End: 2024-12-16 | Stop reason: HOSPADM

## 2024-12-16 RX ORDER — LIDOCAINE HYDROCHLORIDE 10 MG/ML
INJECTION, SOLUTION EPIDURAL; INFILTRATION; INTRACAUDAL; PERINEURAL
Status: DISCONTINUED | OUTPATIENT
Start: 2024-12-16 | End: 2024-12-16

## 2024-12-16 RX ADMIN — GLYCOPYRROLATE 0.2 MG: 0.2 INJECTION INTRAMUSCULAR; INTRAVENOUS at 10:12

## 2024-12-16 RX ADMIN — FENTANYL CITRATE 50 MCG: 50 INJECTION, SOLUTION INTRAMUSCULAR; INTRAVENOUS at 10:12

## 2024-12-16 RX ADMIN — ROCURONIUM BROMIDE 40 MG: 10 INJECTION, SOLUTION INTRAVENOUS at 10:12

## 2024-12-16 RX ADMIN — EPHEDRINE SULFATE 15 MG: 50 INJECTION INTRAVENOUS at 11:12

## 2024-12-16 RX ADMIN — PROPOFOL 120 MG: 10 INJECTION, EMULSION INTRAVENOUS at 10:12

## 2024-12-16 RX ADMIN — SUGAMMADEX 160 MG: 100 INJECTION, SOLUTION INTRAVENOUS at 11:12

## 2024-12-16 RX ADMIN — EPHEDRINE SULFATE 10 MG: 50 INJECTION INTRAVENOUS at 11:12

## 2024-12-16 RX ADMIN — ONDANSETRON HYDROCHLORIDE 4 MG: 2 SOLUTION INTRAMUSCULAR; INTRAVENOUS at 10:12

## 2024-12-16 RX ADMIN — SODIUM CHLORIDE: 9 INJECTION, SOLUTION INTRAVENOUS at 10:12

## 2024-12-16 RX ADMIN — LIDOCAINE HYDROCHLORIDE 20 MG: 10 INJECTION, SOLUTION EPIDURAL; INFILTRATION; INTRACAUDAL; PERINEURAL at 10:12

## 2024-12-16 NOTE — INTERVAL H&P NOTE
The patient has been examined and the H&P has been reviewed:    I concur with the findings and no changes have occurred since H&P was written.    Surgery risks, benefits and alternative options discussed and understood by patient/family.          Active Hospital Problems    Diagnosis  POA    *Rectal pain [K62.89]  Yes      Resolved Hospital Problems   No resolved problems to display.

## 2024-12-16 NOTE — DISCHARGE INSTRUCTIONS
May remove dressing and apply ice for 24 hours, then recommend warm Sitz baths as needed for pain and after BM.     Avoid foods high in fiber and fiber supplement.    Recommend Recticare or Lidocaine cream externally as needed for pain.    Mild bleeding or discharge is expected.    Notify MD for temperature >101, excessive bleeding, and difficulty urinating.

## 2024-12-16 NOTE — ANESTHESIA PROCEDURE NOTES
Intubation    Date/Time: 12/16/2024 10:49 AM    Performed by: Chad Roberson CRNA  Authorized by: Neeraj Stearns DO    Intubation:     Induction:  Intravenous    Intubated:  Postinduction    Mask Ventilation:  Easy mask    Attempts:  2    Attempted By:  CRNA    Method of Intubation:  Direct    Blade:  Marino 4    Laryngeal View Grade: Grade I - full view of cords      Attempted By (2nd Attempt):  CRNA    Method of Intubation (2nd Attempt):  Video laryngoscopy    Blade (2nd Attempt):  Palacios 4    Difficult Airway Encountered?: No      Complications:  None    Airway Device:  Oral endotracheal tube    Airway Device Size:  7.5    Style/Cuff Inflation:  Cuffed (inflated to minimal occlusive pressure)    Tube secured:  20    Secured at:  The lips    Placement Verified By:  Capnometry    Complicating Factors:  None    Findings Post-Intubation:  BS equal bilateral and atraumatic/condition of teeth unchanged

## 2024-12-16 NOTE — TRANSFER OF CARE
Anesthesia Transfer of Care Note    Patient: Samson Aleman    Procedure(s) Performed: Procedure(s) (LRB):  EXAM UNDER ANESTHESIA (N/A)    Patient location: PACU    Anesthesia Type: general    Transport from OR: Transported from OR on room air with adequate spontaneous ventilation    Post pain: adequate analgesia    Post assessment: no apparent anesthetic complications    Post vital signs: stable    Level of consciousness: responds to stimulation    Nausea/Vomiting: no nausea/vomiting    Complications: none    Transfer of care protocol was followed      Last vitals: Visit Vitals  BP (!) 185/84   Pulse (!) 52   Temp 36.4 °C (97.5 °F) (Oral)   Ht 6' (1.829 m)   Wt 84.8 kg (187 lb)   SpO2 99%   BMI 25.36 kg/m²

## 2024-12-16 NOTE — DISCHARGE SUMMARY
[facility] Surgical - Periop Services 2nd Floor  Discharge Note  Short Stay    Procedure(s) (LRB):  EXAM UNDER ANESTHESIA (N/A)      OUTCOME: Patient tolerated treatment/procedure well without complication and is now ready for discharge.    DISPOSITION: Home or Self Care    FINAL DIAGNOSIS:  Residual hemorrhoid tags    FOLLOWUP: In clinic    DISCHARGE INSTRUCTIONS:    Discharge Procedure Orders   Diet Adult Regular     Ice to affected area   Order Comments: For 24 hours     Lifting restrictions   Order Comments: Avoid heavy lifting and excessive straining        TIME SPENT ON DISCHARGE: 15 minutes

## 2024-12-16 NOTE — ANESTHESIA PREPROCEDURE EVALUATION
12/16/2024  Samson Aleman is a 84 y.o., male presents for rectal exam under anesthesia.    Other Medical History   Mixed hyperlipidemia Type 2 diabetes mellitus with unspecified diabetic retinopathy without macular edema   CAD (coronary artery disease) Neuropathy   Insomnia Anxiety disorder, unspecified     Surgical History    Double bypass GALLBLADDER SURGERY   HERNIA REPAIR Removal of gallstones   CARDIAC ELECTROPHYSIOLOGY MAPPING AND ABLATION CORONARY ARTERY BYPASS GRAFT (CABG)     Problem List  Current as of 12/16/24 0902  Diabetic polyneuropathy Mild cognitive impairment   Rectal pain      H/H: 14/47  PLT: 235  EKG: SB, LAD    Pre-op Assessment    I have reviewed the Patient Summary Reports.     I have reviewed the Nursing Notes. I have reviewed the NPO Status.   I have reviewed the Medications.     Review of Systems  Anesthesia Hx:  No problems with previous Anesthesia                Social:  Former Smoker       Cardiovascular:       Past MI CAD   CABG/stent                                       Neurological:    Neuromuscular Disease,                                   Endocrine:  Diabetes           Psych:  Psychiatric History                  Physical Exam  General: Well nourished, Cooperative, Alert and Oriented    Airway:  Mallampati: III   Mouth Opening: Normal  TM Distance: Normal  Tongue: Normal  Neck ROM: Normal ROM    Dental:  Intact    Chest/Lungs:  Clear to auscultation, Normal Respiratory Rate    Heart:  Rate: Normal  Rhythm: Regular Rhythm  Sounds: Normal    Abdomen:  Normal, Soft, Nontender        Anesthesia Plan  Type of Anesthesia, risks & benefits discussed:    Anesthesia Type: Gen Natural Airway  Intra-op Monitoring Plan: Standard ASA Monitors  Post Op Pain Control Plan: multimodal analgesia  Induction:  IV  Airway Plan: Direct  Informed Consent: Informed consent signed with the  Patient and all parties understand the risks and agree with anesthesia plan.  All questions answered.   ASA Score: 3  Day of Surgery Review of History & Physical: H&P Update referred to the surgeon/provider.    Ready For Surgery From Anesthesia Perspective.     .

## 2024-12-16 NOTE — OP NOTE
[facility] Surgical - Periop Services 2nd Floor  Operative Note      Date of Procedure: 12/16/2024     Procedure:  Excision of multiple anal tags     Surgeons and Role:     * Samir Gonzalez MD - Primary    Assistant:  Arlene Acosta NP    Pre-Operative Diagnosis: Rectal pain [K62.89]    Post-Operative Diagnosis:  Multiple residual hemorrhoid tags    Anesthesia: General    Estimated Blood Loss (EBL):  Less than 10 mL           Specimens:  Tags     Description of Technical Procedures:  After informed consent was obtained, patient was brought to the operating room.  General endotracheal anesthesia was administered by member of the anesthesia team.  Patient was placed in the prone augustine-knife position.  Buttocks were taped apart for exposure.  The perianal skin was prepped and draped in sterile surgical fashion.  A medium Hill-Neely retractor was inserted in all 4 quadrants inspected.  No enlarged hemorrhoids or fissure was identified.  He had a large broad-based anterior midline tag that was sharply excised with Metzenbaum scissors.  Hemostasis was achieved with spot cautery.  The anoderm was reapproximated with running 3-0 chromic suture.  Two smaller right-sided tags were similarly excised.  The tags were sent for permanent pathology.  Marcaine 0.25% with epinephrine was used for local anesthesia.  A hydrocortisone suppository was placed within the rectum.  Lidocaine 4% cream was applied externally covered with dry gauze dressing secured with tape.  Patient tolerated the procedure well and there were no complications.  He was returned to the supine position.  He was awakened and extubated in the operating room then subsequently transferred to recovery in satisfactory condition.

## 2024-12-16 NOTE — ANESTHESIA POSTPROCEDURE EVALUATION
Anesthesia Post Evaluation    Patient: Samson Aleman    Procedure(s) Performed: Procedure(s) (LRB):  EXAM UNDER ANESTHESIA (N/A)    Final Anesthesia Type: general      Patient location during evaluation: PACU  Patient participation: Yes- Able to Participate  Level of consciousness: awake and alert  Post-procedure vital signs: reviewed and stable  Pain management: adequate  Airway patency: patent  JON mitigation strategies: Multimodal analgesia  PONV status at discharge: No PONV  Anesthetic complications: no      Cardiovascular status: hemodynamically stable  Respiratory status: unassisted  Hydration status: euvolemic  Follow-up not needed.              Vitals Value Taken Time   /124 12/16/24 1221   Temp 36.5 °C (97.7 °F) 12/16/24 1141   Pulse 63 12/16/24 1225   Resp  12/16/24 1225   SpO2 100 % 12/16/24 1225   Vitals shown include unfiled device data.      Event Time   Out of Recovery 12:05:00         Pain/Aj Score: Aj Score: 10 (12/16/2024 12:05 PM)

## 2024-12-17 LAB
POCT GLUCOSE: 121 MG/DL (ref 70–110)
PSYCHE PATHOLOGY RESULT: NORMAL

## 2024-12-18 VITALS
WEIGHT: 187 LBS | DIASTOLIC BLOOD PRESSURE: 97 MMHG | BODY MASS INDEX: 25.33 KG/M2 | TEMPERATURE: 98 F | OXYGEN SATURATION: 96 % | SYSTOLIC BLOOD PRESSURE: 202 MMHG | HEIGHT: 72 IN | HEART RATE: 64 BPM

## 2024-12-30 ENCOUNTER — OFFICE VISIT (OUTPATIENT)
Dept: SURGICAL ONCOLOGY | Facility: CLINIC | Age: 84
End: 2024-12-30
Payer: MEDICARE

## 2024-12-30 VITALS
SYSTOLIC BLOOD PRESSURE: 159 MMHG | HEART RATE: 60 BPM | HEIGHT: 72 IN | WEIGHT: 195.63 LBS | DIASTOLIC BLOOD PRESSURE: 83 MMHG | OXYGEN SATURATION: 97 % | BODY MASS INDEX: 26.5 KG/M2

## 2024-12-30 DIAGNOSIS — K64.4 ANAL SKIN TAG: Primary | ICD-10-CM

## 2024-12-30 PROCEDURE — 99213 OFFICE O/P EST LOW 20 MIN: CPT | Mod: PBBFAC

## 2024-12-30 PROCEDURE — 99999 PR PBB SHADOW E&M-EST. PATIENT-LVL III: CPT | Mod: PBBFAC,,,

## 2024-12-30 NOTE — PROGRESS NOTES
Colorectal Surgery  Patient ID: [id number]     HPI:     Samson Aleman is a 84 y.o. male here today for a post op visit after having excision of anal tags removed on 12/16/24. Today he reports that he had mild bleeding when he wipes and has a bowel movement, but it has since stopped. He reports he still has rectal burning but hopes that with a bidet, this will improve his hygiene and thus his pain.     Current Outpatient Medications   Medication Instructions    aspirin (ECOTRIN) 81 mg, Daily    atorvastatin (LIPITOR) 40 mg, Nightly    donepeziL (ARICEPT) 5 MG tablet Take 1 tab Po Q PM for 1 month, then increase to 1 PO BID thereafter    finasteride (PROSCAR) 5 mg tablet 1 tablet, Daily    FLUoxetine 20 mg, Daily    gabapentin (NEURONTIN) 1,600 mg, 2 times daily    metFORMIN (GLUCOPHAGE) 500 mg, 2 times daily    testosterone cypionate (DEPOTESTOTERONE CYPIONATE) 200 mg/mL injection Every 7 days    zolpidem (AMBIEN) 10 mg, Nightly       Patient has No Known Allergies.     Patient Care Team:  Clif Christensen MD as PCP - General (Family Medicine)     Objective:     Visit Vitals  BP (!) 159/83   Pulse 60   Ht 6' (1.829 m)   Wt 88.7 kg (195 lb 9.6 oz)   SpO2 97%   BMI 26.53 kg/m²       Physical Exam    General: Alert and oriented, No acute distress.  Head: Normocephalic, Atraumatic.  Eye: Sclera non-icteric.  Respiratory: Non-labored respirations, Symmetrical chest wall expansion.  Gastrointestinal: Soft, Non-distended. Non-tender.   Rectal: Healing incisions  Extremities: No lower extremity edema.  Integumentary: Warm, Dry, Intact.  Neurologic: No focal deficits.    Assessment:       ICD-10-CM ICD-9-CM   1. Anal skin tag  K64.4 455.9        Plan:       1. Anal skin tag    - RTC PRN  - Educated patient to return if rectal bleeding occurs again and does not stop. Will return to clinic in 3 months if bidet and post op inflammation resolution does not improve rectal burning symptoms    No follow-ups on file. In addition  to their scheduled follow up, the patient has also been instructed to follow up on as needed basis.     Future Appointments   Date Time Provider Department Center   4/3/2025  1:30 PM [facility] [address]   5/19/2025 10:30 AM Avani Head MD [facility] [address], [place]

## 2025-04-30 NOTE — DISCHARGE INSTRUCTIONS
Patient Education       Sacral Neuro Stimulator Discharge Instructions     What care is needed at home?   Learn how to program and adjust your stimulator.  Wash your hands before and after touching your wound or dressing.  Avoid things that make you twist, bend, or stretch while you heal. Squat down at the waist if you needed.   If you have a headache, lie flat and drink plenty of fluids.  You may take a shower. Wash your incision, pat dry, then leave open to air. The dermabond (skin glue) will peel off within 1-2 weeks.  No tub baths, swimming, or hot tubs until incision is fully healed.   No heavy lifting over 10 pounds. No strenuous exercising.   Ask your doctor when you may go back to your normal activities like work, driving, or sex.  Try to avoid coughing or sneezing. Also try to avoid straining during bowel movements.  Ask your doctor if you need to limit your driving or not drive for a few weeks.  What follow-up care is needed?   Be sure to keep your follow up visit.  Will physical activity be limited?   You may have to limit your activity. Talk to your doctor about the right amount of activity for you.  Short walks are good for you.  While you heal, your doctor may want you to:  Avoid activities that cause you to bend, stretch, or twist, like working in the garden, using the vacuum, etc.  What changes to diet are needed?   Eat high fiber foods. These include whole grains, fruits, and vegetables.  Limit sugary, fatty, and starchy foods.  Drink 6 to 8 glasses of water each day.  What problems could happen?   Bleeding  Infection  Paralysis or weakness  Worse pain, numbness, or no pain relief  Problems with the stimulator or leads  When do I need to call the doctor?   Signs of infection. These include a fever of 100.4°F (38°C) or higher, chills, wound that will not heal, or pain.  Signs of wound infection. These include swelling, redness, warmth around the wound; too much pain when touched; yellowish, greenish,  or bloody discharge; foul smell coming from the cut site; cut site opens up.  You are getting too much or too little stimulation from your SCS  You are not feeling better in 2 to 3 days or you are feeling worse

## 2025-05-01 ENCOUNTER — ANESTHESIA EVENT (OUTPATIENT)
Dept: SURGERY | Facility: HOSPITAL | Age: 85
End: 2025-05-01
Payer: MEDICARE

## 2025-05-01 ENCOUNTER — HOSPITAL ENCOUNTER (OUTPATIENT)
Facility: HOSPITAL | Age: 85
Discharge: HOME | End: 2025-05-01
Attending: UROLOGY | Admitting: UROLOGY
Payer: MEDICARE

## 2025-05-01 ENCOUNTER — ANESTHESIA (OUTPATIENT)
Dept: SURGERY | Facility: HOSPITAL | Age: 85
End: 2025-05-01
Payer: MEDICARE

## 2025-05-01 DIAGNOSIS — R35.0 URINARY FREQUENCY: Primary | ICD-10-CM

## 2025-05-01 LAB
POCT GLUCOSE: 106 MG/DL (ref 70–110)
POCT GLUCOSE: 112 MG/DL (ref 70–110)

## 2025-05-01 PROCEDURE — 37000008 HC ANESTHESIA 1ST 15 MINUTES: Performed by: UROLOGY

## 2025-05-01 PROCEDURE — 25000003 PHARM REV CODE 250: Performed by: NURSE ANESTHETIST, CERTIFIED REGISTERED

## 2025-05-01 PROCEDURE — 36000707: Performed by: UROLOGY

## 2025-05-01 PROCEDURE — 63600175 PHARM REV CODE 636 W HCPCS: Performed by: ANESTHESIOLOGY

## 2025-05-01 PROCEDURE — 82962 GLUCOSE BLOOD TEST: CPT | Performed by: UROLOGY

## 2025-05-01 PROCEDURE — C1897 LEAD, NEUROSTIM TEST KIT: HCPCS | Performed by: UROLOGY

## 2025-05-01 PROCEDURE — 63600175 PHARM REV CODE 636 W HCPCS: Performed by: NURSE ANESTHETIST, CERTIFIED REGISTERED

## 2025-05-01 PROCEDURE — C1778 LEAD, NEUROSTIMULATOR: HCPCS | Performed by: UROLOGY

## 2025-05-01 PROCEDURE — 71000016 HC POSTOP RECOV ADDL HR: Performed by: UROLOGY

## 2025-05-01 PROCEDURE — 71000033 HC RECOVERY, INTIAL HOUR: Performed by: UROLOGY

## 2025-05-01 PROCEDURE — 71000015 HC POSTOP RECOV 1ST HR: Performed by: UROLOGY

## 2025-05-01 PROCEDURE — C1883 ADAPT/EXT, PACING/NEURO LEAD: HCPCS | Performed by: UROLOGY

## 2025-05-01 PROCEDURE — 27201423 OPTIME MED/SURG SUP & DEVICES STERILE SUPPLY: Performed by: UROLOGY

## 2025-05-01 PROCEDURE — 36000706: Performed by: UROLOGY

## 2025-05-01 PROCEDURE — 63600175 PHARM REV CODE 636 W HCPCS: Performed by: UROLOGY

## 2025-05-01 PROCEDURE — 37000009 HC ANESTHESIA EA ADD 15 MINS: Performed by: UROLOGY

## 2025-05-01 DEVICE — TINED LEAD KIT
Type: IMPLANTABLE DEVICE | Site: SACRUM | Status: FUNCTIONAL
Brand: AXONICS

## 2025-05-01 DEVICE — LEAD IMPLANT KIT
Type: IMPLANTABLE DEVICE | Site: SACRUM | Status: FUNCTIONAL
Brand: AXONICS

## 2025-05-01 DEVICE — PERCUTANEOUS EXTENSION
Type: IMPLANTABLE DEVICE | Site: SACRUM | Status: FUNCTIONAL
Brand: AXONICS

## 2025-05-01 RX ORDER — SODIUM CHLORIDE, SODIUM LACTATE, POTASSIUM CHLORIDE, CALCIUM CHLORIDE 600; 310; 30; 20 MG/100ML; MG/100ML; MG/100ML; MG/100ML
INJECTION, SOLUTION INTRAVENOUS CONTINUOUS
Status: DISCONTINUED | OUTPATIENT
Start: 2025-05-01 | End: 2025-05-01 | Stop reason: HOSPADM

## 2025-05-01 RX ORDER — EPHEDRINE SULFATE 50 MG/ML
INJECTION, SOLUTION INTRAVENOUS
Status: DISCONTINUED | OUTPATIENT
Start: 2025-05-01 | End: 2025-05-01

## 2025-05-01 RX ORDER — GLYCOPYRROLATE 0.2 MG/ML
INJECTION INTRAMUSCULAR; INTRAVENOUS
Status: DISCONTINUED | OUTPATIENT
Start: 2025-05-01 | End: 2025-05-01

## 2025-05-01 RX ORDER — DIPHENHYDRAMINE HYDROCHLORIDE 50 MG/ML
25 INJECTION, SOLUTION INTRAMUSCULAR; INTRAVENOUS ONCE
Status: DISCONTINUED | OUTPATIENT
Start: 2025-05-01 | End: 2025-05-01 | Stop reason: HOSPADM

## 2025-05-01 RX ORDER — LIDOCAINE HYDROCHLORIDE 10 MG/ML
INJECTION, SOLUTION EPIDURAL; INFILTRATION; INTRACAUDAL; PERINEURAL
Status: DISCONTINUED | OUTPATIENT
Start: 2025-05-01 | End: 2025-05-01

## 2025-05-01 RX ORDER — SODIUM CHLORIDE, SODIUM GLUCONATE, SODIUM ACETATE, POTASSIUM CHLORIDE AND MAGNESIUM CHLORIDE 30; 37; 368; 526; 502 MG/100ML; MG/100ML; MG/100ML; MG/100ML; MG/100ML
INJECTION, SOLUTION INTRAVENOUS CONTINUOUS
Status: DISCONTINUED | OUTPATIENT
Start: 2025-05-01 | End: 2025-05-01 | Stop reason: HOSPADM

## 2025-05-01 RX ORDER — GLUCAGON 1 MG
1 KIT INJECTION
Status: DISCONTINUED | OUTPATIENT
Start: 2025-05-01 | End: 2025-05-01 | Stop reason: HOSPADM

## 2025-05-01 RX ORDER — ONDANSETRON HYDROCHLORIDE 2 MG/ML
4 INJECTION, SOLUTION INTRAVENOUS DAILY PRN
Status: DISCONTINUED | OUTPATIENT
Start: 2025-05-01 | End: 2025-05-01 | Stop reason: HOSPADM

## 2025-05-01 RX ORDER — GENTAMICIN 40 MG/ML
INJECTION, SOLUTION INTRAMUSCULAR; INTRAVENOUS
Status: DISCONTINUED
Start: 2025-05-01 | End: 2025-05-01 | Stop reason: HOSPADM

## 2025-05-01 RX ORDER — PROPOFOL 10 MG/ML
VIAL (ML) INTRAVENOUS
Status: DISCONTINUED | OUTPATIENT
Start: 2025-05-01 | End: 2025-05-01

## 2025-05-01 RX ORDER — HYDROMORPHONE HYDROCHLORIDE 2 MG/ML
0.4 INJECTION, SOLUTION INTRAMUSCULAR; INTRAVENOUS; SUBCUTANEOUS EVERY 5 MIN PRN
Refills: 0 | Status: ACTIVE | OUTPATIENT
Start: 2025-05-01 | End: 2025-05-01

## 2025-05-01 RX ORDER — CLINDAMYCIN PHOSPHATE 150 MG/ML
INJECTION, SOLUTION INTRAVENOUS
Status: DISCONTINUED
Start: 2025-05-01 | End: 2025-05-01 | Stop reason: HOSPADM

## 2025-05-01 RX ORDER — FENTANYL CITRATE 50 UG/ML
INJECTION, SOLUTION INTRAMUSCULAR; INTRAVENOUS
Status: DISCONTINUED | OUTPATIENT
Start: 2025-05-01 | End: 2025-05-01

## 2025-05-01 RX ORDER — ONDANSETRON HYDROCHLORIDE 2 MG/ML
INJECTION, SOLUTION INTRAMUSCULAR; INTRAVENOUS
Status: DISCONTINUED | OUTPATIENT
Start: 2025-05-01 | End: 2025-05-01

## 2025-05-01 RX ORDER — METHOCARBAMOL 100 MG/ML
500 INJECTION, SOLUTION INTRAMUSCULAR; INTRAVENOUS ONCE AS NEEDED
Status: COMPLETED | OUTPATIENT
Start: 2025-05-01 | End: 2025-05-01

## 2025-05-01 RX ORDER — CEPHALEXIN 500 MG/1
500 CAPSULE ORAL EVERY 12 HOURS
Qty: 14 CAPSULE | Refills: 0 | Status: SHIPPED | OUTPATIENT
Start: 2025-05-01

## 2025-05-01 RX ORDER — LIDOCAINE HYDROCHLORIDE 10 MG/ML
INJECTION, SOLUTION INFILTRATION; PERINEURAL
Status: DISCONTINUED
Start: 2025-05-01 | End: 2025-05-01 | Stop reason: HOSPADM

## 2025-05-01 RX ORDER — ROCURONIUM BROMIDE 10 MG/ML
INJECTION, SOLUTION INTRAVENOUS
Status: DISCONTINUED | OUTPATIENT
Start: 2025-05-01 | End: 2025-05-01

## 2025-05-01 RX ORDER — LIDOCAINE HYDROCHLORIDE 10 MG/ML
INJECTION, SOLUTION INFILTRATION; PERINEURAL
Status: DISCONTINUED | OUTPATIENT
Start: 2025-05-01 | End: 2025-05-01 | Stop reason: HOSPADM

## 2025-05-01 RX ORDER — LIDOCAINE HYDROCHLORIDE 10 MG/ML
1 INJECTION, SOLUTION EPIDURAL; INFILTRATION; INTRACAUDAL; PERINEURAL ONCE
Status: DISCONTINUED | OUTPATIENT
Start: 2025-05-01 | End: 2025-05-01 | Stop reason: HOSPADM

## 2025-05-01 RX ORDER — CEFAZOLIN SODIUM 1 G/3ML
1 INJECTION, POWDER, FOR SOLUTION INTRAMUSCULAR; INTRAVENOUS ONCE
Status: COMPLETED | OUTPATIENT
Start: 2025-05-01 | End: 2025-05-01

## 2025-05-01 RX ORDER — CLINDAMYCIN PHOSPHATE 150 MG/ML
INJECTION, SOLUTION INTRAVENOUS
Status: DISCONTINUED | OUTPATIENT
Start: 2025-05-01 | End: 2025-05-01 | Stop reason: HOSPADM

## 2025-05-01 RX ORDER — MIDAZOLAM HYDROCHLORIDE 2 MG/2ML
2 INJECTION, SOLUTION INTRAMUSCULAR; INTRAVENOUS ONCE AS NEEDED
Status: DISCONTINUED | OUTPATIENT
Start: 2025-05-01 | End: 2025-05-01 | Stop reason: HOSPADM

## 2025-05-01 RX ORDER — ACETAMINOPHEN 325 MG/1
650 TABLET ORAL EVERY 4 HOURS PRN
Status: DISCONTINUED | OUTPATIENT
Start: 2025-05-01 | End: 2025-05-01 | Stop reason: HOSPADM

## 2025-05-01 RX ORDER — ONDANSETRON 4 MG/1
4 TABLET, ORALLY DISINTEGRATING ORAL ONCE
Status: DISCONTINUED | OUTPATIENT
Start: 2025-05-01 | End: 2025-05-01 | Stop reason: HOSPADM

## 2025-05-01 RX ORDER — GENTAMICIN 40 MG/ML
INJECTION, SOLUTION INTRAMUSCULAR; INTRAVENOUS
Status: DISCONTINUED | OUTPATIENT
Start: 2025-05-01 | End: 2025-05-01 | Stop reason: HOSPADM

## 2025-05-01 RX ORDER — HYDROCODONE BITARTRATE AND ACETAMINOPHEN 5; 325 MG/1; MG/1
1 TABLET ORAL EVERY 4 HOURS PRN
Status: DISCONTINUED | OUTPATIENT
Start: 2025-05-01 | End: 2025-05-01 | Stop reason: HOSPADM

## 2025-05-01 RX ORDER — TRAMADOL HYDROCHLORIDE 50 MG/1
50 TABLET ORAL EVERY 6 HOURS PRN
Qty: 20 EACH | Refills: 0 | Status: SHIPPED | OUTPATIENT
Start: 2025-05-01

## 2025-05-01 RX ORDER — PHENYLEPHRINE HYDROCHLORIDE 10 MG/ML
INJECTION INTRAVENOUS
Status: DISCONTINUED | OUTPATIENT
Start: 2025-05-01 | End: 2025-05-01

## 2025-05-01 RX ORDER — BUPIVACAINE HYDROCHLORIDE 2.5 MG/ML
INJECTION, SOLUTION EPIDURAL; INFILTRATION; INTRACAUDAL; PERINEURAL
Status: DISCONTINUED
Start: 2025-05-01 | End: 2025-05-01 | Stop reason: WASHOUT

## 2025-05-01 RX ADMIN — ONDANSETRON 4 MG: 2 INJECTION INTRAMUSCULAR; INTRAVENOUS at 03:05

## 2025-05-01 RX ADMIN — CEFAZOLIN 1 G: 330 INJECTION, POWDER, FOR SOLUTION INTRAMUSCULAR; INTRAVENOUS at 02:05

## 2025-05-01 RX ADMIN — SODIUM CHLORIDE, POTASSIUM CHLORIDE, SODIUM LACTATE AND CALCIUM CHLORIDE: 600; 310; 30; 20 INJECTION, SOLUTION INTRAVENOUS at 02:05

## 2025-05-01 RX ADMIN — FENTANYL CITRATE 50 MCG: 50 INJECTION, SOLUTION INTRAMUSCULAR; INTRAVENOUS at 01:05

## 2025-05-01 RX ADMIN — EPHEDRINE SULFATE 10 MG: 50 INJECTION INTRAVENOUS at 02:05

## 2025-05-01 RX ADMIN — GLYCOPYRROLATE 0.2 MG: 0.2 INJECTION INTRAMUSCULAR; INTRAVENOUS at 02:05

## 2025-05-01 RX ADMIN — SODIUM CHLORIDE, POTASSIUM CHLORIDE, SODIUM LACTATE AND CALCIUM CHLORIDE: 600; 310; 30; 20 INJECTION, SOLUTION INTRAVENOUS at 01:05

## 2025-05-01 RX ADMIN — ROCURONIUM BROMIDE 50 MG: 10 INJECTION, SOLUTION INTRAVENOUS at 01:05

## 2025-05-01 RX ADMIN — PHENYLEPHRINE HYDROCHLORIDE 100 MCG: 10 INJECTION INTRAVENOUS at 03:05

## 2025-05-01 RX ADMIN — EPHEDRINE SULFATE 10 MG: 50 INJECTION INTRAVENOUS at 03:05

## 2025-05-01 RX ADMIN — SUGAMMADEX 200 MG: 100 INJECTION, SOLUTION INTRAVENOUS at 02:05

## 2025-05-01 RX ADMIN — LIDOCAINE HYDROCHLORIDE 20 MG: 10 INJECTION, SOLUTION EPIDURAL; INFILTRATION; INTRACAUDAL; PERINEURAL at 01:05

## 2025-05-01 RX ADMIN — PROPOFOL 150 MG: 10 INJECTION, EMULSION INTRAVENOUS at 01:05

## 2025-05-01 RX ADMIN — METHOCARBAMOL 500 MG: 100 INJECTION INTRAMUSCULAR; INTRAVENOUS at 04:05

## 2025-05-01 NOTE — OP NOTE
Samson Aleman   1940   [id number]     Date of Procedure: 5/1/2025              PreOP Dx:  Pre-Op Diagnosis Codes:      * Urinary frequency [R35.0]     * Dysuria [R30.0]     * Nocturia [R35.1]     Post Op Dx: Post-Op Diagnosis Codes:     * Urinary frequency [R35.0]     * Dysuria [R30.0]     * Nocturia [R35.1]       Procedure:  Right S3 permanent sacral neuromodulator lead (Axonics Stage I procedure)       Surgeon:  Chad Milligan MD      EBL:  Less than 5 cc      Procedure:   After informed consent was obtained, the patient was taken to the operating room after receiving a preop dose of antibiotics.  He was given a general anesthetic and placed in a prone position with all his pressure points padded.  His lower back buttocks and perineum were all prepped and draped sterilely.  Using the bony landmarks and fluoroscopy identified the right S3 foramen.  I passed a 3-1/2 inch spinal needle through the S3 foramen as medial as possible and this was confirmed with both AP and lateral fluoroscopic guidance.  We stimulated the needle he got great Manish response but no flexion of the great toe.  I placed a 3-1/2 inch spinal needle through the left S3 foramen as well to test that side we got good Manish response and also no flexion of the great toe.  I went back to the right side I placed a guidewire through the needle and removed the needle.  I used a 15 blade to make an incision adjacent to the guidewire and then I placed a trocar over the guidewire on the right side under lateral fluoroscopic guidance.  The stylet was taken out of the trocar and we placed the permanent lead through the right S3 foramen under fluoroscopic guidance.  We stimulated positions 3,2,1 and 0 we got Manish response in all positions and again no flexion of the great toe.  I used a tunneling device to tunnel the permanent lead over to the right upper buttock after an incision was made.  I connected the stage I connector and then tunneled  that over to the left lower back area.  .  I closed the incision in the right upper buttock with a running 3-0 Vicryl suture followed by running 4-0 Monocryl subcuticular stitch for the skin.  I closed the incision of the right S3 foramen with a 4-0 Monocryl subcuticular stitch.  He tolerated the procedure well there were no apparent complications he was extubated and brought to recovery in good condition

## 2025-05-01 NOTE — PLAN OF CARE
Problem: Fall Injury Risk  Goal: Absence of Fall and Fall-Related Injury  Outcome: Met     Problem: Infection  Goal: Absence of Infection Signs and Symptoms  Outcome: Met     Problem: Pain Acute  Goal: Optimal Pain Control and Function  Outcome: Met     Problem: Adult Inpatient Plan of Care  Goal: Plan of Care Review  Outcome: Met  Goal: Patient-Specific Goal (Individualized)  Outcome: Met  Goal: Absence of Hospital-Acquired Illness or Injury  Outcome: Met  Goal: Optimal Comfort and Wellbeing  Outcome: Met  Goal: Readiness for Transition of Care  Outcome: Met     Problem: Diabetes Comorbidity  Goal: Blood Glucose Level Within Targeted Range  Outcome: Met     Problem: Wound  Goal: Optimal Coping  Outcome: Met  Goal: Optimal Functional Ability  Outcome: Met  Goal: Absence of Infection Signs and Symptoms  Outcome: Met  Goal: Improved Oral Intake  Outcome: Met  Goal: Optimal Pain Control and Function  Outcome: Met  Goal: Skin Health and Integrity  Outcome: Met  Goal: Optimal Wound Healing  Outcome: Met

## 2025-05-01 NOTE — CARE UPDATE
Dr. Sanchez rounding and updated on his status, v/s, med given, CBG of 106.  No new orders given at present.

## 2025-05-01 NOTE — ANESTHESIA PROCEDURE NOTES
Intubation    Date/Time: 5/1/2025 1:50 PM    Performed by: Shala Tarango CRNA  Authorized by: Emanuel Bowie MD    Intubation:     Induction:  Intravenous    Intubated:  Postinduction    Mask Ventilation:  Easy mask    Attempts:  1    Attempted By:  CRNA    Method of Intubation:  Direct    Blade:  Marino 3    Laryngeal View Grade: Grade I - full view of cords      Difficult Airway Encountered?: No      Complications:  None    Airway Device:  Oral endotracheal tube    Airway Device Size:  7.5    Style/Cuff Inflation:  Cuffed (inflated to minimal occlusive pressure)    Inflation Amount (mL):  6    Tube secured:  21    Secured at:  The lips    Placement Verified By:  Capnometry    Complicating Factors:  None    Findings Post-Intubation:  BS equal bilateral

## 2025-05-01 NOTE — ANESTHESIA PREPROCEDURE EVALUATION
05/01/2025  Procedure Information    Case: [id number] Date/Time: 05/01/25 1300   Procedure: INSERTION, NEUROSTIMULATOR, TEMPORARY, SACRAL // Axonics sacronero modulator Stage 1   Anesthesia type: Monitor Anesthesia Care   Diagnosis:      Urinary frequency [R35.0]      Dysuria [R30.0]      Nocturia [R35.1]   Pre-op diagnosis:      Urinary frequency [R35.0]      Dysuria [R30.0]      Nocturia [R35.1]   Location: [facility] OR  / [facility] O    is a 85 y.o., male.      Pre-op Assessment    I have reviewed the Patient Summary Reports.     I have reviewed the Nursing Notes. I have reviewed the NPO Status.   I have reviewed the Medications.     Review of Systems  Anesthesia Hx:  No problems with previous Anesthesia               Denies Personal Hx of Anesthesia complications.                    Hematology/Oncology:  Hematology Normal   Oncology Normal                                   EENT/Dental:  EENT/Dental Normal           Cardiovascular:  Cardiovascular Normal Exercise tolerance: good      CAD  asymptomatic                Functional Capacity good / => 4 METS                         Pulmonary:  Pulmonary Normal                       Renal/:   Denies Chronic Renal Disease.                Hepatic/GI:  Hepatic/GI Normal                    Musculoskeletal:  Musculoskeletal Normal                Neurological:  Neurology Normal                                      Endocrine:  Diabetes         Denies Morbid Obesity / BMI > 40  Dermatological:  Skin Normal    Psych:   anxiety               Physical Exam  General: Alert, Oriented, Well nourished and Cooperative    Airway:  Mallampati: II   Mouth Opening: Normal  TM Distance: Normal  Tongue: Normal  Neck ROM: Normal ROM    Dental:  Intact    Chest/Lungs:  Clear to auscultation, Normal Respiratory Rate    Heart:  Rate: Normal  Rhythm: Regular Rhythm     Latest Reference Range &  Units 12/03/24 09:41   WBC 4.50 - 11.50 x10(3)/mcL 5.85   RBC 4.70 - 6.10 x10(6)/mcL 5.21   Hemoglobin 14.0 - 18.0 g/dL 14.4   Hematocrit 42.0 - 52.0 % 47.4   MCV 80.0 - 94.0 fL 91.0   MCH 27.0 - 31.0 pg 27.6   MCHC 33.0 - 36.0 g/dL 30.4 (L)   RDW 11.5 - 17.0 % 15.4   Platelet Count 130 - 400 x10(3)/mcL 235   MPV 7.4 - 10.4 fL 9.3   nRBC % 0.0   Sodium 136 - 145 mmol/L 139   Potassium 3.5 - 5.1 mmol/L 4.3   Chloride 98 - 107 mmol/L 103   CO2 23 - 31 mmol/L 29   Anion Gap mEq/L 7.0   BUN 8.4 - 25.7 mg/dL 17.9   Creatinine 0.72 - 1.25 mg/dL 0.85   BUN/CREAT RATIO  21   eGFR mL/min/1.73/m2 >60   Glucose 82 - 115 mg/dL 92   Calcium 8.8 - 10.0 mg/dL 9.4   ALP 40 - 150 unit/L 108   PROTEIN TOTAL 5.8 - 7.6 gm/dL 6.4   Albumin 3.4 - 4.8 g/dL 3.9   Albumin/Globulin Ratio 1.1 - 2.0 ratio 1.6   BILIRUBIN TOTAL <=1.5 mg/dL 0.8   AST 5 - 34 unit/L 20   ALT 0 - 55 unit/L 19   Globulin, Total 2.4 - 3.5 gm/dL 2.5   (L): Data is abnormally low  est Reason : K62.89,    Vent. Rate :  52 BPM     Atrial Rate :  52 BPM     P-R Int : 164 ms          QRS Dur : 106 ms      QT Int : 434 ms       P-R-T Axes :  60 -42  26 degrees    QTcB Int : 403 ms    Sinus bradycardia  Left axis deviation  Abnormal ECG  No previous ECGs available  Confirmed by Comfort Duvall ([id number]) on 12/3/2024 5:01:35 PM    Referred By: RADHA WANG           Confirmed By: Comfort Duvall     Specimen Collected: 12/03/24 09:34 CST Last Resulted: 12/03/24 17:01 CST           Anesthesia Plan  Type of Anesthesia, risks & benefits discussed:    Anesthesia Type: Gen Supraglottic Airway  Intra-op Monitoring Plan: Standard ASA Monitors  Post Op Pain Control Plan: multimodal analgesia  Induction:  IV and Inhalation  Airway Plan: Direct  Informed Consent: Informed consent signed with the Patient and all parties understand the risks and agree with anesthesia plan.  All questions answered. Patient consented to blood products? Yes  ASA Score: 3  Day of Surgery Review of  History & Physical: H&P Update referred to the surgeon/provider.I have interviewed and examined the patient. I have reviewed the patient's H&P dated: There are no significant changes.     Ready For Surgery From Anesthesia Perspective.     .

## 2025-05-01 NOTE — DISCHARGE SUMMARY
[facility] Surgical - Periop Services  Discharge Note  Short Stay    Procedure(s) (LRB):  INSERTION, NEUROSTIMULATOR, TEMPORARY, SACRAL // Axonics sacronero modulator Stage 1 (N/A)      OUTCOME: Patient tolerated treatment/procedure well without complication and is now ready for discharge.    DISPOSITION: Home or Self Care    FINAL DIAGNOSIS:  Urinary frequency    FOLLOWUP: In clinic    DISCHARGE INSTRUCTIONS:  No discharge procedures on file.     TIME SPENT ON DISCHARGE: 10 minutes

## 2025-05-01 NOTE — PLAN OF CARE
Discharge instructions given. Denies pain at this time. All criteria met and appropriate for discharge home with friend.

## 2025-05-01 NOTE — CARE UPDATE
Received patient from the OR, he is awake, alert x4, huang, reassured him, denied c/o, respirations full-regular-deep-clear,hob up 30 degrees.

## 2025-05-01 NOTE — TRANSFER OF CARE
Anesthesia Transfer of Care Note    Patient: Samson Aleman    Procedure(s) Performed: Procedure(s) (LRB):  INSERTION, NEUROSTIMULATOR, TEMPORARY, SACRAL // Axonics sacronero modulator Stage 1 (N/A)    Patient location: PACU    Anesthesia Type: general    Transport from OR: Transported from OR on room air with adequate spontaneous ventilation    Post pain: adequate analgesia    Post assessment: no apparent anesthetic complications    Post vital signs: stable    Level of consciousness: responds to stimulation    Nausea/Vomiting: no nausea/vomiting    Complications: none    Transfer of care protocol was followed      Last vitals: Visit Vitals  BP (!) 159/87   Pulse (!) 51   Temp 36.6 °C (97.9 °F)   Resp 20   Ht 6' (1.829 m)   Wt 88.4 kg (194 lb 14.2 oz)   BMI 26.43 kg/m²

## 2025-05-02 VITALS
RESPIRATION RATE: 18 BRPM | OXYGEN SATURATION: 97 % | TEMPERATURE: 98 F | BODY MASS INDEX: 26.4 KG/M2 | DIASTOLIC BLOOD PRESSURE: 88 MMHG | HEIGHT: 72 IN | WEIGHT: 194.88 LBS | HEART RATE: 118 BPM | SYSTOLIC BLOOD PRESSURE: 155 MMHG

## 2025-05-02 NOTE — ANESTHESIA POSTPROCEDURE EVALUATION
Anesthesia Post Evaluation    Patient: Samson Aleman    Procedure(s) Performed: Procedure(s) (LRB):  INSERTION, NEUROSTIMULATOR, TEMPORARY, SACRAL // Axonics sacronero modulator Stage 1 (N/A)    Final Anesthesia Type: spinal      Patient location during evaluation: floor  Patient participation: Yes- Able to Participate  Level of consciousness: awake and alert and oriented  Post-procedure vital signs: reviewed and stable  Pain management: adequate  Airway patency: patent  JON mitigation strategies: Verification of full reversal of neuromuscular block  PONV status at discharge: No PONV, nausea (controlled) and vomiting (controlled)  Anesthetic complications: no      Cardiovascular status: blood pressure returned to baseline and stable  Respiratory status: unassisted  Hydration status: euvolemic  Follow-up not needed.  Comments: SENSORI-MOTOR INTACT              Vitals Value Taken Time   /88 05/01/25 17:20   Temp 36.8 °C (98.2 °F) 05/01/25 16:33   Pulse 118 05/01/25 17:30   Resp 18 05/01/25 17:30   SpO2 97 % 05/01/25 17:30         Event Time   Out of Recovery 16:28:00         Pain/Aj Score: Aj Score: 10 (5/1/2025  4:35 PM)

## 2025-05-19 ENCOUNTER — OFFICE VISIT (OUTPATIENT)
Dept: NEUROLOGY | Facility: CLINIC | Age: 85
End: 2025-05-19
Payer: MEDICARE

## 2025-05-19 VITALS
DIASTOLIC BLOOD PRESSURE: 83 MMHG | SYSTOLIC BLOOD PRESSURE: 163 MMHG | HEART RATE: 56 BPM | WEIGHT: 198 LBS | HEIGHT: 73 IN | BODY MASS INDEX: 26.24 KG/M2

## 2025-05-19 DIAGNOSIS — E11.42 DIABETIC POLYNEUROPATHY ASSOCIATED WITH TYPE 2 DIABETES MELLITUS: ICD-10-CM

## 2025-05-19 DIAGNOSIS — G31.84 MILD COGNITIVE IMPAIRMENT: Primary | ICD-10-CM

## 2025-05-19 DIAGNOSIS — F03.A0 MILD DEMENTIA WITHOUT BEHAVIORAL DISTURBANCE, PSYCHOTIC DISTURBANCE, MOOD DISTURBANCE, OR ANXIETY, UNSPECIFIED DEMENTIA TYPE: ICD-10-CM

## 2025-05-19 PROCEDURE — 99999 PR PBB SHADOW E&M-EST. PATIENT-LVL III: CPT | Mod: PBBFAC,,, | Performed by: PSYCHIATRY & NEUROLOGY

## 2025-05-19 PROCEDURE — 99213 OFFICE O/P EST LOW 20 MIN: CPT | Mod: PBBFAC | Performed by: PSYCHIATRY & NEUROLOGY

## 2025-05-19 RX ORDER — DONEPEZIL HYDROCHLORIDE 10 MG/1
10 TABLET, FILM COATED ORAL DAILY
Qty: 30 TABLET | Refills: 11 | Status: SHIPPED | OUTPATIENT
Start: 2025-05-19

## 2025-05-19 RX ORDER — GABAPENTIN 400 MG/1
1600 CAPSULE ORAL 2 TIMES DAILY
Qty: 240 CAPSULE | Refills: 11 | Status: SHIPPED | OUTPATIENT
Start: 2025-05-19

## 2025-05-19 NOTE — PROGRESS NOTES
Chief Complaint   Patient presents with    Memory Loss     Pt here for 6 month f/u for memory loss and neuropathy. Pt states his memory went down a little bit since his last visit. Pt states gabapentin has been very beneficial.        This is a 85 y.o. male here for follow up for memory loss   And neuropathy.  He overall feels his symptoms are stable.  He feels gabapentin has been very beneficial.    Medication List with Changes/Refills   Current Medications    ASPIRIN (ECOTRIN) 81 MG EC TABLET    Take 81 mg by mouth Daily.    ATORVASTATIN (LIPITOR) 40 MG TABLET    Take 40 mg by mouth every evening.    CEPHALEXIN (KEFLEX) 500 MG CAPSULE    Take 1 capsule (500 mg total) by mouth every 12 (twelve) hours.    FINASTERIDE (PROSCAR) 5 MG TABLET    Take 1 tablet by mouth once daily.    FLUOXETINE 20 MG CAPSULE    Take 20 mg by mouth Daily.    METFORMIN (GLUCOPHAGE) 500 MG TABLET    Take 500 mg by mouth 2 (two) times daily.    TESTOSTERONE CYPIONATE (DEPOTESTOTERONE CYPIONATE) 200 MG/ML INJECTION    Inject into the muscle every 7 days.    TRAMADOL (ULTRAM) 50 MG TABLET    Take 1 tablet (50 mg total) by mouth every 6 (six) hours as needed for Pain.    ZOLPIDEM (AMBIEN) 10 MG TAB    Take 10 mg by mouth every evening.   Changed and/or Refilled Medications    Modified Medication Previous Medication    DONEPEZIL (ARICEPT) 10 MG TABLET donepeziL (ARICEPT) 5 MG tablet       Take 1 tablet (10 mg total) by mouth once daily.    Take 1 tab Po Q PM for 1 month, then increase to 1 PO BID thereafter    GABAPENTIN (NEURONTIN) 400 MG CAPSULE gabapentin (NEURONTIN) 400 MG capsule       Take 4 capsules (1,600 mg total) by mouth 2 (two) times a day.    Take 1,600 mg by mouth 2 (two) times a day.        Vitals:    05/19/25 1028   BP: (!) 163/83   Pulse: (!) 56        NAD  Alert and oriented  Cognition and perception intact  No aphasia  EOMI  No facial asymmetry  No dysarthria  Moves all extremities symmetrically  No gross coordination  abnormalities  Gait normal     MOCA 24/30    1. Mild cognitive impairment  Overview:  Initially evaluated in July 2023 for difficulty with short-term memory that started about 2 years prior.  MRI brain 2022 with significant volume loss and chronic microvascular ischemia.    MOCAs:   07/2023: 26/30-missed 2 on recall  05/2024: 23/30      2. Mild dementia without behavioral disturbance, psychotic disturbance, mood disturbance, or anxiety, unspecified dementia type  -     donepeziL (ARICEPT) 10 MG tablet; Take 1 tablet (10 mg total) by mouth once daily.  Dispense: 30 tablet; Refill: 11    3. Diabetic polyneuropathy associated with type 2 diabetes mellitus    Other orders  -     gabapentin (NEURONTIN) 400 MG capsule; Take 4 capsules (1,600 mg total) by mouth 2 (two) times a day.  Dispense: 240 capsule; Refill: 11       Memory is testing is stable.  Continue Aricept   Continue gabapentin for neuropathy

## (undated) DEVICE — GLOVE SIGNATURE MICRO LTX 7.5

## (undated) DEVICE — SUT VICRYL 3-0 27 SH

## (undated) DEVICE — GLOVE PROTEXIS BLUE LATEX 8

## (undated) DEVICE — PENCIL ELECSURG ROCKER 15FT

## (undated) DEVICE — NDL SYR 10ML 18X1.5 LL BLUNT

## (undated) DEVICE — SEE MEDLINE ITEM 154981

## (undated) DEVICE — SPONGE COTTON TRAY 4X4IN

## (undated) DEVICE — DRAPE C-ARM COVER EZ 36X28IN

## (undated) DEVICE — SOL IRRI STRL WATER 1000ML

## (undated) DEVICE — Device

## (undated) DEVICE — ADHESIVE DERMABOND ADVANCED

## (undated) DEVICE — SUPPORT ULNA NERVE PROTECTOR

## (undated) DEVICE — HEADREST DERMAPROX PED 7IN

## (undated) DEVICE — NDL HYPO REG 25G X 1 1/2

## (undated) DEVICE — NDL 27G X 1 1/4

## (undated) DEVICE — CLOSURE SKIN STERI STRIP 1/2X4

## (undated) DEVICE — GAUZE DRAIN N WVN 6PLY 4X4IN

## (undated) DEVICE — DRESSING TRANS 4X4 TEGADERM

## (undated) DEVICE — ELECTRODE PATIENT RETURN DISP

## (undated) DEVICE — DRAPE INCISE IOBAN 2 23X23IN

## (undated) DEVICE — SUT CHROMIC 3-0 SH 27IN GUT

## (undated) DEVICE — SUT MCRYL PLUS 4-0 PS2 27IN

## (undated) DEVICE — COVER TRNSDUC CIV-FLX 8.9X91.5

## (undated) DEVICE — PENCIL SMOKE EVAC TELSCP 15FT

## (undated) DEVICE — STIMULATOR AXONICS EXT TRIAL

## (undated) DEVICE — SOL NACL IRR 1000ML BTL

## (undated) DEVICE — BLADE SURG STAINLESS STEEL #15